# Patient Record
Sex: MALE | Race: WHITE | NOT HISPANIC OR LATINO | Employment: UNEMPLOYED | URBAN - METROPOLITAN AREA
[De-identification: names, ages, dates, MRNs, and addresses within clinical notes are randomized per-mention and may not be internally consistent; named-entity substitution may affect disease eponyms.]

---

## 2023-06-07 ENCOUNTER — OFFICE VISIT (OUTPATIENT)
Dept: FAMILY MEDICINE CLINIC | Facility: CLINIC | Age: 58
End: 2023-06-07
Payer: MEDICAID

## 2023-06-07 VITALS
TEMPERATURE: 98.3 F | WEIGHT: 198 LBS | OXYGEN SATURATION: 98 % | SYSTOLIC BLOOD PRESSURE: 132 MMHG | DIASTOLIC BLOOD PRESSURE: 88 MMHG | RESPIRATION RATE: 14 BRPM | HEART RATE: 72 BPM | BODY MASS INDEX: 27.72 KG/M2 | HEIGHT: 71 IN

## 2023-06-07 DIAGNOSIS — E78.2 MIXED HYPERLIPIDEMIA: ICD-10-CM

## 2023-06-07 DIAGNOSIS — M54.50 CHRONIC MIDLINE LOW BACK PAIN WITHOUT SCIATICA: ICD-10-CM

## 2023-06-07 DIAGNOSIS — E55.9 VITAMIN D DEFICIENCY: ICD-10-CM

## 2023-06-07 DIAGNOSIS — G89.29 CHRONIC MIDLINE LOW BACK PAIN WITHOUT SCIATICA: ICD-10-CM

## 2023-06-07 DIAGNOSIS — E87.8 ELECTROLYTE ABNORMALITY: ICD-10-CM

## 2023-06-07 DIAGNOSIS — K21.9 GASTROESOPHAGEAL REFLUX DISEASE WITHOUT ESOPHAGITIS: ICD-10-CM

## 2023-06-07 DIAGNOSIS — Z13.0 SCREENING, IRON DEFICIENCY ANEMIA: ICD-10-CM

## 2023-06-07 DIAGNOSIS — Z12.11 ENCOUNTER FOR COLORECTAL CANCER SCREENING: ICD-10-CM

## 2023-06-07 DIAGNOSIS — I10 ESSENTIAL HYPERTENSION: ICD-10-CM

## 2023-06-07 DIAGNOSIS — Z12.12 ENCOUNTER FOR COLORECTAL CANCER SCREENING: ICD-10-CM

## 2023-06-07 DIAGNOSIS — E11.9 TYPE 2 DIABETES MELLITUS WITHOUT COMPLICATION, WITHOUT LONG-TERM CURRENT USE OF INSULIN (HCC): Primary | ICD-10-CM

## 2023-06-07 DIAGNOSIS — Z12.5 PROSTATE CANCER SCREENING: ICD-10-CM

## 2023-06-07 DIAGNOSIS — Z13.29 THYROID DISORDER SCREENING: ICD-10-CM

## 2023-06-07 PROCEDURE — 99204 OFFICE O/P NEW MOD 45 MIN: CPT | Performed by: STUDENT IN AN ORGANIZED HEALTH CARE EDUCATION/TRAINING PROGRAM

## 2023-06-07 RX ORDER — ATORVASTATIN CALCIUM 20 MG/1
20 TABLET, FILM COATED ORAL
Qty: 90 TABLET | Refills: 1 | Status: SHIPPED | OUTPATIENT
Start: 2023-06-07

## 2023-06-07 RX ORDER — OMEPRAZOLE 20 MG/1
20 CAPSULE, DELAYED RELEASE ORAL DAILY
Qty: 90 CAPSULE | Refills: 1 | Status: SHIPPED | OUTPATIENT
Start: 2023-06-07

## 2023-06-07 RX ORDER — LOSARTAN POTASSIUM 100 MG/1
100 TABLET ORAL DAILY
Qty: 90 TABLET | Refills: 1 | Status: SHIPPED | OUTPATIENT
Start: 2023-06-07

## 2023-06-07 RX ORDER — ATORVASTATIN CALCIUM 20 MG/1
20 TABLET, FILM COATED ORAL
COMMUNITY
Start: 2023-05-22 | End: 2023-06-07 | Stop reason: SDUPTHER

## 2023-06-07 RX ORDER — CHLORAL HYDRATE 500 MG
1 CAPSULE ORAL 2 TIMES DAILY
COMMUNITY

## 2023-06-07 RX ORDER — OMEPRAZOLE 20 MG/1
20 CAPSULE, DELAYED RELEASE ORAL DAILY
COMMUNITY
End: 2023-06-07 | Stop reason: SDUPTHER

## 2023-06-07 RX ORDER — ASPIRIN 81 MG/1
81 TABLET ORAL DAILY
COMMUNITY

## 2023-06-07 RX ORDER — ZINC GLUCONATE 50 MG
50 TABLET ORAL DAILY
COMMUNITY

## 2023-06-07 RX ORDER — LOSARTAN POTASSIUM 100 MG/1
100 TABLET ORAL DAILY
COMMUNITY
Start: 2023-05-22 | End: 2023-06-07 | Stop reason: SDUPTHER

## 2023-06-07 NOTE — PROGRESS NOTES
Clinic Visit Note  Irma Espana 56 y o  male   MRN: [id number]    Assessment and Plan      Diagnoses and all orders for this visit:    Type 2 diabetes mellitus without complication, without long-term current use of insulin (HCC)  -     metFORMIN (GLUCOPHAGE) 500 mg tablet; Take 1 tablet (500 mg total) by mouth 2 (two) times a day  -     Hemoglobin A1C; Future    Gastroesophageal reflux disease without esophagitis  -     omeprazole (PriLOSEC) 20 mg delayed release capsule; Take 1 capsule (20 mg total) by mouth in the morning    Essential hypertension  -     losartan (COZAAR) 100 MG tablet; Take 1 tablet (100 mg total) by mouth in the morning  -     metoprolol tartrate (LOPRESSOR) 25 mg tablet; Take 1 tablet (25 mg total) by mouth 2 (two) times a day    Mixed hyperlipidemia  -     atorvastatin (LIPITOR) 20 mg tablet; Take 1 tablet (20 mg total) by mouth daily at bedtime  -     Lipid panel; Future    Chronic midline low back pain without sciatica  -     Diclofenac Sodium (VOLTAREN) 1 %; Apply 2 g topically 4 (four) times a day    Thyroid disorder screening  -     TSH, 3rd generation with Free T4 reflex; Future    Vitamin D deficiency  -     Vitamin D 25 hydroxy; Future    Screening, iron deficiency anemia  -     CBC and differential; Future    Prostate cancer screening  -     PSA, total and free; Future    Encounter for colorectal cancer screening  -     Cologuard    Electrolyte abnormality  -     Comprehensive metabolic panel; Future    Other orders  -     Omega-3 Fatty Acids (fish oil) 1,000 mg; Take 1 capsule by mouth 2 (two) times a day  -     aspirin (ECOTRIN LOW STRENGTH) 81 mg EC tablet; Take 81 mg by mouth daily  -     Discontinue: atorvastatin (LIPITOR) 20 mg tablet; Take 20 mg by mouth daily at bedtime  -     Cholecalciferol 25 MCG (1000 UT) capsule; Take 5,000 Units by mouth  -     Discontinue: losartan (COZAAR) 100 MG tablet;  Take 100 mg by mouth in the morning  -     Discontinue: metFORMIN (GLUCOPHAGE) 500 mg tablet; Take 500 mg by mouth 2 (two) times a day  -     Discontinue: metoprolol tartrate (LOPRESSOR) 25 mg tablet; Take 25 mg by mouth 2 (two) times a day  -     Discontinue: omeprazole (PriLOSEC) 20 mg delayed release capsule; Take 20 mg by mouth in the morning  -     Zinc 50 MG TABS; Take 50 mg by mouth in the morning      My impressions and treatment recommendations were discussed in detail with the patient who verbalized understanding and had no further questions  Discharge instructions were provided  Subjective     Chief Complaint: NP    History of Present Illness:    Patient is a pleasant 53-year-old male coming in for new patient/establish care visit, medications reviewed, medications refilled  Recommending annual blood work with annual physical follow-up  The following portions of the patient's history were reviewed and updated as appropriate: allergies, current medications, past family history, past medical history, past social history, past surgical history and problem list     REVIEW OF SYSTEMS:  A complete 12-point review of systems is negative other than that noted in the HPI  Review of Systems   Constitutional: Negative for chills, fatigue and fever  HENT: Negative for congestion and sore throat  Eyes: Negative for pain and visual disturbance  Respiratory: Negative for shortness of breath and wheezing  Cardiovascular: Negative for chest pain and palpitations  Gastrointestinal: Negative for abdominal pain, constipation, diarrhea, nausea and vomiting  Genitourinary: Negative for dysuria and frequency  Musculoskeletal: Positive for back pain  Negative for neck pain  Skin: Negative for color change and rash  Neurological: Negative for dizziness and headaches  Psychiatric/Behavioral: Negative for agitation and confusion  All other systems reviewed and are negative          Current Outpatient Medications:   •  aspirin (ECOTRIN LOW STRENGTH) 81 mg EC tablet, Take 81 mg by mouth daily, Disp: , Rfl:   •  atorvastatin (LIPITOR) 20 mg tablet, Take 1 tablet (20 mg total) by mouth daily at bedtime, Disp: 90 tablet, Rfl: 1  •  Cholecalciferol 25 MCG (1000 UT) capsule, Take 5,000 Units by mouth, Disp: , Rfl:   •  Diclofenac Sodium (VOLTAREN) 1 %, Apply 2 g topically 4 (four) times a day, Disp: 150 g, Rfl: 0  •  losartan (COZAAR) 100 MG tablet, Take 1 tablet (100 mg total) by mouth in the morning, Disp: 90 tablet, Rfl: 1  •  metFORMIN (GLUCOPHAGE) 500 mg tablet, Take 1 tablet (500 mg total) by mouth 2 (two) times a day, Disp: 180 tablet, Rfl: 1  •  metoprolol tartrate (LOPRESSOR) 25 mg tablet, Take 1 tablet (25 mg total) by mouth 2 (two) times a day, Disp: 180 tablet, Rfl: 1  •  Omega-3 Fatty Acids (fish oil) 1,000 mg, Take 1 capsule by mouth 2 (two) times a day, Disp: , Rfl:   •  omeprazole (PriLOSEC) 20 mg delayed release capsule, Take 1 capsule (20 mg total) by mouth in the morning, Disp: 90 capsule, Rfl: 1  •  Zinc 50 MG TABS, Take 50 mg by mouth in the morning, Disp: , Rfl:   Past Medical History:   Diagnosis Date   • Diabetes mellitus ([facility] [address] )    • High cholesterol    • Hypertension      Past Surgical History:   Procedure Laterality Date   • CARDIAC CATHETERIZATION       Social History     Socioeconomic History   • Marital status: Single     Spouse name: Not on file   • Number of children: Not on file   • Years of education: Not on file   • Highest education level: Not on file   Occupational History   • Not on file   Tobacco Use   • Smoking status: Never   • Smokeless tobacco: Never   Vaping Use   • Vaping Use: Never used   Substance and Sexual Activity   • Alcohol use: Yes     Comment: rare   • Drug use: Never   • Sexual activity: Not on file   Other Topics Concern   • Not on file   Social History Narrative   • Not on file     Social Determinants of Health     Financial Resource Strain: Not on file   Food Insecurity: Not on file   Transportation Needs: Not on file "  Physical Activity: Not on file   Stress: Not on file   Social Connections: Not on file   Intimate Partner Violence: Not on file   Housing Stability: Not on file     Family History   Problem Relation Age of Onset   • Diabetes Mother    • Hypertension Father      Allergies   Allergen Reactions   • Gadolinium Derivatives Angioedema   • Penicillins Rash       Objective     Vitals:    06/07/23 0818   BP: 132/88   BP Location: Left arm   Patient Position: Sitting   Cuff Size: Adult   Pulse: 72   Resp: 14   Temp: 98 3 °F (36 8 °C)   TempSrc: Temporal   SpO2: 98%   Weight: 89 8 kg (198 lb)   Height: 5' 11\" (1 803 m)       Physical Exam:     GENERAL: NAD, pleasant   HEENT:  NC/AT, PERRL, EOMI, no scleral icterus  CARDIAC:  RRR, +S1/S2, no S3/S4 appreciated, no m/g/r  PULMONARY:  CTA B/L, no wheezing/rales/rhonci, non-labored breathing  ABDOMEN:  Soft, NT/ND, no rebound/guarding/rigidity  Extremities:  No edema, cyanosis, or clubbing  Musculoskeletal:  Full range of motion intact in all extremities   NEUROLOGIC: Grossly intact, no focal deficits  SKIN:  No rashes or erythema noted on exposed skin  Psych: Normal affect, mood stable    ==  PLEASE NOTE:  This encounter was completed utilizing the [employer] Direct Speech Voice Recognition Software  Grammatical errors, random word insertions, pronoun errors and incomplete sentences are occasional consequences of the system due to software limitations, ambient noise and hardware issues  These may be missed by proof reading prior to affixing electronic signature  Any questions or concerns about the content, text or information contained within the body of this dictation should be directly addressed to the physician for clarification  Please do not hesitate to call me directly if you have any any questions or concerns      DO Venkata Juarez [address] Internal Medicine   [facility]    "

## 2023-06-24 LAB
25(OH)D3+25(OH)D2 SERPL-MCNC: 87.3 NG/ML (ref 30–100)
ALBUMIN SERPL-MCNC: 4.4 G/DL (ref 3.8–4.9)
ALBUMIN/GLOB SERPL: 1.6 {RATIO} (ref 1.2–2.2)
ALP SERPL-CCNC: 77 IU/L (ref 44–121)
ALT SERPL-CCNC: 20 IU/L (ref 0–44)
AST SERPL-CCNC: 21 IU/L (ref 0–40)
BASOPHILS # BLD AUTO: 0.1 X10E3/UL (ref 0–0.2)
BASOPHILS NFR BLD AUTO: 1 %
BILIRUB SERPL-MCNC: 1 MG/DL (ref 0–1.2)
BUN SERPL-MCNC: 18 MG/DL (ref 6–24)
BUN/CREAT SERPL: 15 (ref 9–20)
CALCIUM SERPL-MCNC: 10.1 MG/DL (ref 8.7–10.2)
CHLORIDE SERPL-SCNC: 93 MMOL/L (ref 96–106)
CHOLEST SERPL-MCNC: 159 MG/DL (ref 100–199)
CO2 SERPL-SCNC: 22 MMOL/L (ref 20–29)
CREAT SERPL-MCNC: 1.24 MG/DL (ref 0.76–1.27)
EGFR: 69 ML/MIN/1.73
EOSINOPHIL # BLD AUTO: 0.2 X10E3/UL (ref 0–0.4)
EOSINOPHIL NFR BLD AUTO: 2 %
ERYTHROCYTE [DISTWIDTH] IN BLOOD BY AUTOMATED COUNT: 12.2 % (ref 11.6–15.4)
GLOBULIN SER-MCNC: 2.7 G/DL (ref 1.5–4.5)
GLUCOSE SERPL-MCNC: 236 MG/DL (ref 70–99)
HBA1C MFR BLD: 11.1 % (ref 4.8–5.6)
HCT VFR BLD AUTO: 48.5 % (ref 37.5–51)
HDLC SERPL-MCNC: 37 MG/DL
HGB BLD-MCNC: 16.9 G/DL (ref 13–17.7)
IMM GRANULOCYTES # BLD: 0 X10E3/UL (ref 0–0.1)
IMM GRANULOCYTES NFR BLD: 0 %
LDLC SERPL CALC-MCNC: 88 MG/DL (ref 0–99)
LYMPHOCYTES # BLD AUTO: 2.6 X10E3/UL (ref 0.7–3.1)
LYMPHOCYTES NFR BLD AUTO: 29 %
MCH RBC QN AUTO: 29.8 PG (ref 26.6–33)
MCHC RBC AUTO-ENTMCNC: 34.8 G/DL (ref 31.5–35.7)
MCV RBC AUTO: 86 FL (ref 79–97)
MICRODELETION SYND BLD/T FISH: NORMAL
MONOCYTES # BLD AUTO: 0.6 X10E3/UL (ref 0.1–0.9)
MONOCYTES NFR BLD AUTO: 7 %
NEUTROPHILS # BLD AUTO: 5.4 X10E3/UL (ref 1.4–7)
NEUTROPHILS NFR BLD AUTO: 61 %
PLATELET # BLD AUTO: 272 X10E3/UL (ref 150–450)
POTASSIUM SERPL-SCNC: 4.8 MMOL/L (ref 3.5–5.2)
PROT SERPL-MCNC: 7.1 G/DL (ref 6–8.5)
PSA FREE MFR SERPL: 57.7 %
PSA FREE SERPL-MCNC: 0.75 NG/ML
PSA SERPL-MCNC: 1.3 NG/ML (ref 0–4)
RBC # BLD AUTO: 5.67 X10E6/UL (ref 4.14–5.8)
SL AMB VLDL CHOLESTEROL CALC: 34 MG/DL (ref 5–40)
SODIUM SERPL-SCNC: 132 MMOL/L (ref 134–144)
TRIGL SERPL-MCNC: 200 MG/DL (ref 0–149)
TSH SERPL DL<=0.005 MIU/L-ACNC: 2.16 UIU/ML (ref 0.45–4.5)
WBC # BLD AUTO: 9 X10E3/UL (ref 3.4–10.8)

## 2023-07-04 LAB — COLOGUARD RESULT REPORTABLE: NEGATIVE

## 2023-07-12 ENCOUNTER — OFFICE VISIT (OUTPATIENT)
Dept: FAMILY MEDICINE CLINIC | Facility: CLINIC | Age: 58
End: 2023-07-12
Payer: MEDICAID

## 2023-07-12 VITALS
BODY MASS INDEX: 28.92 KG/M2 | TEMPERATURE: 98.3 F | DIASTOLIC BLOOD PRESSURE: 80 MMHG | RESPIRATION RATE: 14 BRPM | WEIGHT: 202 LBS | SYSTOLIC BLOOD PRESSURE: 140 MMHG | HEIGHT: 70 IN | OXYGEN SATURATION: 98 % | HEART RATE: 66 BPM

## 2023-07-12 DIAGNOSIS — E78.2 MIXED HYPERLIPIDEMIA: ICD-10-CM

## 2023-07-12 DIAGNOSIS — G89.29 CHRONIC MIDLINE LOW BACK PAIN WITHOUT SCIATICA: ICD-10-CM

## 2023-07-12 DIAGNOSIS — I10 ESSENTIAL HYPERTENSION: ICD-10-CM

## 2023-07-12 DIAGNOSIS — Z23 NEED FOR VACCINATION: ICD-10-CM

## 2023-07-12 DIAGNOSIS — M54.50 CHRONIC MIDLINE LOW BACK PAIN WITHOUT SCIATICA: ICD-10-CM

## 2023-07-12 DIAGNOSIS — Z00.00 ANNUAL PHYSICAL EXAM: Primary | ICD-10-CM

## 2023-07-12 DIAGNOSIS — K21.9 GASTROESOPHAGEAL REFLUX DISEASE WITHOUT ESOPHAGITIS: ICD-10-CM

## 2023-07-12 DIAGNOSIS — E11.9 TYPE 2 DIABETES MELLITUS WITHOUT COMPLICATION, WITHOUT LONG-TERM CURRENT USE OF INSULIN (HCC): ICD-10-CM

## 2023-07-12 PROCEDURE — 90471 IMMUNIZATION ADMIN: CPT | Performed by: STUDENT IN AN ORGANIZED HEALTH CARE EDUCATION/TRAINING PROGRAM

## 2023-07-12 PROCEDURE — 99396 PREV VISIT EST AGE 40-64: CPT | Performed by: STUDENT IN AN ORGANIZED HEALTH CARE EDUCATION/TRAINING PROGRAM

## 2023-07-12 PROCEDURE — 90715 TDAP VACCINE 7 YRS/> IM: CPT | Performed by: STUDENT IN AN ORGANIZED HEALTH CARE EDUCATION/TRAINING PROGRAM

## 2023-07-12 RX ORDER — GLIPIZIDE 5 MG/1
5 TABLET ORAL
Qty: 180 TABLET | Refills: 3 | Status: SHIPPED | OUTPATIENT
Start: 2023-07-12

## 2023-07-12 NOTE — PROGRESS NOTES
[facility]    NAME: Jonna Friend  AGE: 66 y.o. SEX: male  : 1965     DATE: 2023     Assessment and Plan:     Problem List Items Addressed This Visit        Digestive    Gastroesophageal reflux disease without esophagitis       Endocrine    Type 2 diabetes mellitus without complication, without long-term current use of insulin (HCC)    Relevant Medications    metFORMIN (GLUCOPHAGE) 1000 MG tablet    glipiZIDE (GLUCOTROL) 5 mg tablet    Other Relevant Orders    Albumin / creatinine urine ratio    Hemoglobin A1C       Cardiovascular and Mediastinum    Essential hypertension       Other    Mixed hyperlipidemia    Chronic midline low back pain without sciatica   Other Visit Diagnoses     Annual physical exam    -  Primary    Relevant Orders    TDAP VACCINE GREATER THAN OR EQUAL TO 8YO IM (Completed)    Need for vaccination        Relevant Orders    TDAP VACCINE GREATER THAN OR EQUAL TO 8YO IM (Completed)        Physical exam completed in office today, type 2 diabetes mellitus uncontrolled with a hemoglobin A1c of 11.1%, we discussed lifestyle modifications including low added sugar diet, routine exercise, patient very adamant that he would not like to start insulin or injectable therapy, we will increase metformin and add glipizide. Plans to recheck hemoglobin A1c in 3 months. Continue antihypertensive regimen with losartan and metoprolol tartrate. Aspirin/statin therapy. Follow-up 3 months recommended. Immunizations and preventive care screenings were discussed with patient today. Appropriate education was printed on patient's after visit summary. Discussed risks and benefits of prostate cancer screening. We discussed the controversial history of PSA screening for prostate cancer in the [facility] as well as the risk of over detection and over treatment of prostate cancer by way of PSA screening.   The patient understands that PSA blood testing is an imperfect way to screen for prostate cancer and that elevated PSA levels in the blood may also be caused by infection, inflammation, prostatic trauma or manipulation, urological procedures, or by benign prostatic enlargement. The role of the digital rectal examination in prostate cancer screening was also discussed and I discussed with him that there is large interobserver variability in the findings of digital rectal examination. Counseling:  Alcohol/drug use: discussed moderation in alcohol intake, the recommendations for healthy alcohol use, and avoidance of illicit drug use. Dental Health: discussed importance of regular tooth brushing, flossing, and dental visits. Injury prevention: discussed safety/seat belts, safety helmets, smoke detectors, carbon dioxide detectors, and smoking near bedding or upholstery. Sexual health: discussed sexually transmitted diseases, partner selection, use of condoms, avoidance of unintended pregnancy, and contraceptive alternatives. Exercise: the importance of regular exercise/physical activity was discussed. Recommend exercise 3-5 times per week for at least 30 minutes. BMI Counseling: Body mass index is 29.4 kg/m². The BMI is above normal. Nutrition recommendations include decreasing portion sizes, encouraging healthy choices of fruits and vegetables and moderation in carbohydrate intake. Exercise recommendations include moderate physical activity 150 minutes/week. Patient referred to PCP. Rationale for BMI follow-up plan is due to patient being overweight or obese. Depression Screening and Follow-up Plan: Patient was screened for depression during today's encounter. They screened negative with a PHQ-2 score of 0. Return in about 3 months (around 10/12/2023), or if symptoms worsen or fail to improve.      Chief Complaint:     Chief Complaint   Patient presents with   • Physical Exam      History of Present Illness:     Adult Annual Physical   Patient here for a comprehensive physical exam. The patient reports no problems. Diet and Physical Activity  Diet/Nutrition: diabetic diet. Exercise: no formal exercise. Depression Screening  PHQ-2/9 Depression Screening    Little interest or pleasure in doing things: 0 - not at all  Feeling down, depressed, or hopeless: 0 - not at all  PHQ-2 Score: 0  PHQ-2 Interpretation: Negative depression screen       General Health  Sleep: sleeps well. Hearing: normal - bilateral.  Vision: no vision problems. Dental: regular dental visits.  Health  Symptoms include: none     Review of Systems:     Review of Systems   Constitutional: Negative for chills, fatigue and fever. HENT: Negative for congestion and sore throat. Eyes: Negative for pain and visual disturbance. Respiratory: Negative for shortness of breath and wheezing. Cardiovascular: Negative for chest pain and palpitations. Gastrointestinal: Negative for abdominal pain, constipation, diarrhea, nausea and vomiting. Genitourinary: Negative for dysuria and frequency. Musculoskeletal: Negative for back pain and neck pain. Skin: Negative for color change and rash. Neurological: Negative for dizziness and headaches. Psychiatric/Behavioral: Negative for agitation and confusion. All other systems reviewed and are negative.      Past Medical History:     Past Medical History:   Diagnosis Date   • Diabetes mellitus ([address])    • High cholesterol    • Hypertension       Past Surgical History:     Past Surgical History:   Procedure Laterality Date   • CARDIAC CATHETERIZATION        Family History:     Family History   Problem Relation Age of Onset   • Diabetes Mother    • Hypertension Father       Social History:     Social History     Socioeconomic History   • Marital status: Single     Spouse name: None   • Number of children: None   • Years of education: None   • Highest education level: None Occupational History   • None   Tobacco Use   • Smoking status: Never   • Smokeless tobacco: Never   Vaping Use   • Vaping Use: Never used   Substance and Sexual Activity   • Alcohol use: Yes     Comment: rare   • Drug use: Never   • Sexual activity: None   Other Topics Concern   • None   Social History Narrative   • None     Social Determinants of Health     Financial Resource Strain: Not on file   Food Insecurity: Not on file   Transportation Needs: Not on file   Physical Activity: Not on file   Stress: Not on file   Social Connections: Not on file   Intimate Partner Violence: Not on file   Housing Stability: Not on file      Current Medications:     Current Outpatient Medications   Medication Sig Dispense Refill   • aspirin (ECOTRIN LOW STRENGTH) 81 mg EC tablet Take 81 mg by mouth daily     • atorvastatin (LIPITOR) 20 mg tablet Take 1 tablet (20 mg total) by mouth daily at bedtime 90 tablet 1   • Cholecalciferol 25 MCG (1000 UT) capsule Take 5,000 Units by mouth     • glipiZIDE (GLUCOTROL) 5 mg tablet Take 1 tablet (5 mg total) by mouth 2 (two) times a day before meals 180 tablet 3   • losartan (COZAAR) 100 MG tablet Take 1 tablet (100 mg total) by mouth in the morning 90 tablet 1   • metFORMIN (GLUCOPHAGE) 1000 MG tablet Take 1 tablet (1,000 mg total) by mouth 2 (two) times a day with meals 180 tablet 3   • metoprolol tartrate (LOPRESSOR) 25 mg tablet Take 1 tablet (25 mg total) by mouth 2 (two) times a day 180 tablet 1   • Omega-3 Fatty Acids (fish oil) 1,000 mg Take 1 capsule by mouth 2 (two) times a day     • omeprazole (PriLOSEC) 20 mg delayed release capsule Take 1 capsule (20 mg total) by mouth in the morning 90 capsule 1   • Zinc 50 MG TABS Take 50 mg by mouth in the morning       No current facility-administered medications for this visit. Allergies:      Allergies   Allergen Reactions   • Gadolinium Derivatives Angioedema   • Penicillins Rash      Physical Exam:     /80 (BP Location: Left arm, Patient Position: Sitting, Cuff Size: Standard)   Pulse 66   Temp 98.3 °F (36.8 °C) (Temporal)   Resp 14   Ht 5' 9.5" (1.765 m)   Wt 91.6 kg (202 lb)   SpO2 98%   BMI 29.40 kg/m²     Physical Exam  Constitutional:       General: He is not in acute distress. HENT:      Head: Normocephalic and atraumatic. Eyes:      General: No scleral icterus. Conjunctiva/sclera: Conjunctivae normal.   Cardiovascular:      Rate and Rhythm: Normal rate and regular rhythm. Pulses: Normal pulses. Heart sounds: No murmur heard. Pulmonary:      Effort: Pulmonary effort is normal. No respiratory distress. Breath sounds: Normal breath sounds. No wheezing or rales. Abdominal:      General: Bowel sounds are normal. There is no distension. Palpations: Abdomen is soft. Tenderness: There is no abdominal tenderness. There is no guarding. Musculoskeletal:         General: No swelling. Normal range of motion. Skin:     General: Skin is warm and dry. Capillary Refill: Capillary refill takes less than 2 seconds. Neurological:      General: No focal deficit present. Mental Status: He is alert and oriented to person, place, and time. Mental status is at baseline.    Psychiatric:         Mood and Affect: Mood normal.         Behavior: Behavior normal.          Gelacio Aguillon DO  [address] [place] [address]

## 2023-10-20 ENCOUNTER — OFFICE VISIT (OUTPATIENT)
Dept: FAMILY MEDICINE CLINIC | Facility: CLINIC | Age: 58
End: 2023-10-20

## 2023-10-20 ENCOUNTER — TELEPHONE (OUTPATIENT)
Age: 58
End: 2023-10-20

## 2023-10-20 VITALS
DIASTOLIC BLOOD PRESSURE: 72 MMHG | OXYGEN SATURATION: 98 % | BODY MASS INDEX: 28.77 KG/M2 | HEIGHT: 70 IN | HEART RATE: 72 BPM | WEIGHT: 201 LBS | TEMPERATURE: 98.2 F | RESPIRATION RATE: 14 BRPM | SYSTOLIC BLOOD PRESSURE: 120 MMHG

## 2023-10-20 DIAGNOSIS — M54.50 CHRONIC MIDLINE LOW BACK PAIN WITHOUT SCIATICA: ICD-10-CM

## 2023-10-20 DIAGNOSIS — E11.9 TYPE 2 DIABETES MELLITUS WITHOUT COMPLICATION, WITHOUT LONG-TERM CURRENT USE OF INSULIN (HCC): Primary | ICD-10-CM

## 2023-10-20 DIAGNOSIS — I10 ESSENTIAL HYPERTENSION: ICD-10-CM

## 2023-10-20 DIAGNOSIS — E78.2 MIXED HYPERLIPIDEMIA: ICD-10-CM

## 2023-10-20 DIAGNOSIS — G89.29 CHRONIC MIDLINE LOW BACK PAIN WITHOUT SCIATICA: ICD-10-CM

## 2023-10-20 DIAGNOSIS — Z23 ENCOUNTER FOR IMMUNIZATION: ICD-10-CM

## 2023-10-20 DIAGNOSIS — K21.9 GASTROESOPHAGEAL REFLUX DISEASE WITHOUT ESOPHAGITIS: ICD-10-CM

## 2023-10-20 LAB — SL AMB POCT HEMOGLOBIN AIC: 10.2 (ref ?–6.5)

## 2023-10-20 RX ORDER — INSULIN GLARGINE 100 [IU]/ML
20 INJECTION, SOLUTION SUBCUTANEOUS
Qty: 15 ML | Refills: 1 | Status: SHIPPED | OUTPATIENT
Start: 2023-10-20

## 2023-10-20 RX ORDER — NEEDLES, DISPOSABLE 25GX5/8"
NEEDLE, DISPOSABLE MISCELLANEOUS
Qty: 100 EACH | Refills: 0 | Status: SHIPPED | OUTPATIENT
Start: 2023-10-20

## 2023-10-20 RX ORDER — LANCETS
EACH MISCELLANEOUS
Qty: 100 EACH | Refills: 1 | Status: SHIPPED | OUTPATIENT
Start: 2023-10-20

## 2023-10-20 NOTE — PROGRESS NOTES
Clinic Visit Note  Latoya Conn 66 y.o. male   MRN: [id number]    Assessment and Plan      Diagnoses and all orders for this visit:    Type 2 diabetes mellitus without complication, without long-term current use of insulin ([address])  Given that hemoglobin A1c greater than 10%, recommend endocrinology evaluation, insulin therapy with Basaglar 20 units nightly which should be covered by insurance. If patient does start insulin therapy recommend discontinuing glipizide, discussed in office, continue metformin, statin, ARB therapy. -     POCT hemoglobin A1c  -     Ambulatory Referral to Endocrinology; Future  -     Discontinue: insulin glargine (LANTUS SOLOSTAR) 100 units/mL injection pen; Inject 20 Units under the skin daily  -     NEEDLE, DISP, 30 G (BD Disp Needles) 30G X 1/2" MISC; Use daily at bedtime    Encounter for immunization  -     FLUZONE: influenza vaccine, quadrivalent, 0.5 mL    Essential hypertension  Blood pressure appropriate in office today continue antihypertension regimen    Mixed hyperlipidemia  Continue statin therapy    Gastroesophageal reflux disease without esophagitis  Continue PPI therapy    Chronic midline low back pain without sciatica  Improved, continue to monitor symptoms, stretching/strengthening exercises    My impressions and treatment recommendations were discussed in detail with the patient who verbalized understanding and had no further questions. Discharge instructions were provided. Subjective     Chief Complaint: F/U    History of Present Illness:    Patient is a pleasant 68-year-old male coming in for follow-up on diabetic management, hemoglobin A1c 10.2%, long discussion on starting insulin therapy.     The following portions of the patient's history were reviewed and updated as appropriate: allergies, current medications, past family history, past medical history, past social history, past surgical history and problem list.    REVIEW OF SYSTEMS:  A complete 12-point review of systems is negative other than that noted in the HPI. Review of Systems   Constitutional:  Negative for chills, fatigue and fever. HENT:  Negative for congestion and sore throat. Eyes:  Negative for pain and visual disturbance. Respiratory:  Negative for shortness of breath and wheezing. Cardiovascular:  Negative for chest pain and palpitations. Gastrointestinal:  Negative for abdominal pain, constipation, diarrhea, nausea and vomiting. Genitourinary:  Negative for dysuria and frequency. Musculoskeletal:  Negative for back pain and neck pain. Skin:  Negative for color change and rash. Neurological:  Negative for dizziness and headaches. Psychiatric/Behavioral:  Negative for agitation and confusion. All other systems reviewed and are negative.         Current Outpatient Medications:   •  aspirin (ECOTRIN LOW STRENGTH) 81 mg EC tablet, Take 81 mg by mouth daily, Disp: , Rfl:   •  atorvastatin (LIPITOR) 20 mg tablet, Take 1 tablet (20 mg total) by mouth daily at bedtime, Disp: 90 tablet, Rfl: 1  •  Cholecalciferol 25 MCG (1000 UT) capsule, Take 5,000 Units by mouth, Disp: , Rfl:   •  glipiZIDE (GLUCOTROL) 5 mg tablet, Take 1 tablet (5 mg total) by mouth 2 (two) times a day before meals, Disp: 180 tablet, Rfl: 3  •  losartan (COZAAR) 100 MG tablet, Take 1 tablet (100 mg total) by mouth in the morning, Disp: 90 tablet, Rfl: 1  •  metFORMIN (GLUCOPHAGE) 1000 MG tablet, Take 1 tablet (1,000 mg total) by mouth 2 (two) times a day with meals, Disp: 180 tablet, Rfl: 3  •  metoprolol tartrate (LOPRESSOR) 25 mg tablet, Take 1 tablet (25 mg total) by mouth 2 (two) times a day, Disp: 180 tablet, Rfl: 1  •  NEEDLE, DISP, 30 G (BD Disp Needles) 30G X 1/2" MISC, Use daily at bedtime, Disp: 100 each, Rfl: 0  •  Omega-3 Fatty Acids (fish oil) 1,000 mg, Take 1 capsule by mouth 2 (two) times a day, Disp: , Rfl:   •  omeprazole (PriLOSEC) 20 mg delayed release capsule, Take 1 capsule (20 mg total) by mouth in the morning, Disp: 90 capsule, Rfl: 1  •  Zinc 50 MG TABS, Take 50 mg by mouth in the morning, Disp: , Rfl:   •  Insulin Glargine Solostar (Basaglar KwikPen) 100 UNIT/ML SOPN, Inject 0.2 mL (20 Units total) under the skin daily at bedtime, Disp: 15 mL, Rfl: 1  •  Lancets (onetouch ultrasoft) lancets, Use as instructed, Disp: 100 each, Rfl: 1  Past Medical History:   Diagnosis Date   • Diabetes mellitus ([address])    • High cholesterol    • Hypertension      Past Surgical History:   Procedure Laterality Date   • CARDIAC CATHETERIZATION       Social History     Socioeconomic History   • Marital status: Single     Spouse name: Not on file   • Number of children: Not on file   • Years of education: Not on file   • Highest education level: Not on file   Occupational History   • Not on file   Tobacco Use   • Smoking status: Never   • Smokeless tobacco: Never   Vaping Use   • Vaping Use: Never used   Substance and Sexual Activity   • Alcohol use: Yes     Comment: rare   • Drug use: Never   • Sexual activity: Not on file   Other Topics Concern   • Not on file   Social History Narrative   • Not on file     Social Determinants of Health     Financial Resource Strain: Not on file   Food Insecurity: Not on file   Transportation Needs: Not on file   Physical Activity: Not on file   Stress: Not on file   Social Connections: Not on file   Intimate Partner Violence: Not on file   Housing Stability: Not on file     Family History   Problem Relation Age of Onset   • Diabetes Mother    • Hypertension Father      Allergies   Allergen Reactions   • Gadolinium Derivatives Angioedema   • Penicillins Rash       Objective     Vitals:    10/20/23 1156   BP: 120/72   BP Location: Left arm   Patient Position: Sitting   Cuff Size: Adult   Pulse: 72   Resp: 14   Temp: 98.2 °F (36.8 °C)   TempSrc: Temporal   SpO2: 98%   Weight: 91.2 kg (201 lb)   Height: 5' 9.5" (1.765 m)       Physical Exam:     GENERAL: NAD, pleasant   HEENT:  NC/AT, PERRL, EOMI, no scleral icterus  CARDIAC:  RRR, +S1/S2, no S3/S4 appreciated, no m/g/r  PULMONARY:  CTA B/L, no wheezing/rales/rhonci, non-labored breathing  ABDOMEN:  Soft, NT/ND, no rebound/guarding/rigidity  Extremities:. No edema, cyanosis, or clubbing  Musculoskeletal:  Full range of motion intact in all extremities   NEUROLOGIC: Grossly intact, no focal deficits  SKIN:  No rashes or erythema noted on exposed skin  Psych: Normal affect, mood stable    ==  PLEASE NOTE:  This encounter was completed utilizing the [employer] Direct Speech Voice Recognition Software. Grammatical errors, random word insertions, pronoun errors and incomplete sentences are occasional consequences of the system due to software limitations, ambient noise and hardware issues. These may be missed by proof reading prior to affixing electronic signature. Any questions or concerns about the content, text or information contained within the body of this dictation should be directly addressed to the physician for clarification. Please do not hesitate to call me directly if you have any any questions or concerns.     DO Sanjeev Baez [facility]   [facility]

## 2023-10-20 NOTE — TELEPHONE ENCOUNTER
Received a call from Matt Sanchez from [employer]. Matt Sanchez states the doctor ordered Lantus & ot is not covered. The preferred medication is Basaglar, also need an order for lancets.

## 2023-11-16 DIAGNOSIS — I10 ESSENTIAL HYPERTENSION: ICD-10-CM

## 2023-11-16 RX ORDER — LOSARTAN POTASSIUM 100 MG/1
100 TABLET ORAL EVERY MORNING
Qty: 90 TABLET | Refills: 1 | Status: SHIPPED | OUTPATIENT
Start: 2023-11-16

## 2023-11-17 DIAGNOSIS — E78.2 MIXED HYPERLIPIDEMIA: ICD-10-CM

## 2023-11-17 DIAGNOSIS — I10 ESSENTIAL HYPERTENSION: ICD-10-CM

## 2023-11-17 DIAGNOSIS — K21.9 GASTROESOPHAGEAL REFLUX DISEASE WITHOUT ESOPHAGITIS: ICD-10-CM

## 2023-11-17 RX ORDER — ATORVASTATIN CALCIUM 20 MG/1
20 TABLET, FILM COATED ORAL
Qty: 90 TABLET | Refills: 1 | Status: SHIPPED | OUTPATIENT
Start: 2023-11-17

## 2023-11-17 RX ORDER — OMEPRAZOLE 20 MG/1
20 CAPSULE, DELAYED RELEASE ORAL DAILY
Qty: 90 CAPSULE | Refills: 1 | Status: SHIPPED | OUTPATIENT
Start: 2023-11-17

## 2023-11-17 NOTE — TELEPHONE ENCOUNTER
Reason for call:   [x] Refill   [] Prior Auth  [] Other:     Office:   [x] PCP/Provider - Chary  [] Specialty/Provider -     Medication:     Atorvastatin 20mg QD #90  Metoprolol Tartrate 25mg BID #180  Omeprazole 20mg QD #90     Pharmacy: [facility]    Request from pharmacy received

## 2023-11-28 RX ORDER — PEN NEEDLE, DIABETIC 31 GX5/16"
NEEDLE, DISPOSABLE MISCELLANEOUS
COMMUNITY
Start: 2023-10-20

## 2023-11-29 ENCOUNTER — CONSULT (OUTPATIENT)
Dept: ENDOCRINOLOGY | Facility: CLINIC | Age: 58
End: 2023-11-29
Payer: MEDICAID

## 2023-11-29 VITALS
HEIGHT: 70 IN | WEIGHT: 200 LBS | SYSTOLIC BLOOD PRESSURE: 160 MMHG | OXYGEN SATURATION: 98 % | BODY MASS INDEX: 28.63 KG/M2 | HEART RATE: 80 BPM | DIASTOLIC BLOOD PRESSURE: 100 MMHG

## 2023-11-29 DIAGNOSIS — E11.65 TYPE 2 DIABETES MELLITUS WITH HYPERGLYCEMIA, WITH LONG-TERM CURRENT USE OF INSULIN (HCC): Primary | ICD-10-CM

## 2023-11-29 DIAGNOSIS — E11.9 TYPE 2 DIABETES MELLITUS WITHOUT COMPLICATION, WITHOUT LONG-TERM CURRENT USE OF INSULIN (HCC): ICD-10-CM

## 2023-11-29 DIAGNOSIS — I10 ESSENTIAL HYPERTENSION: ICD-10-CM

## 2023-11-29 DIAGNOSIS — Z79.4 TYPE 2 DIABETES MELLITUS WITH HYPERGLYCEMIA, WITH LONG-TERM CURRENT USE OF INSULIN (HCC): Primary | ICD-10-CM

## 2023-11-29 DIAGNOSIS — E66.3 OVERWEIGHT (BMI 25.0-29.9): ICD-10-CM

## 2023-11-29 DIAGNOSIS — E78.2 MIXED HYPERLIPIDEMIA: ICD-10-CM

## 2023-11-29 PROCEDURE — 99245 OFF/OP CONSLTJ NEW/EST HI 55: CPT | Performed by: INTERNAL MEDICINE

## 2023-11-29 RX ORDER — BLOOD-GLUCOSE METER
EACH MISCELLANEOUS 2 TIMES DAILY
Qty: 1 KIT | Refills: 0 | Status: SHIPPED | OUTPATIENT
Start: 2023-11-29

## 2023-11-29 RX ORDER — LANCETS
EACH MISCELLANEOUS
Qty: 100 EACH | Refills: 3 | Status: SHIPPED | OUTPATIENT
Start: 2023-11-29

## 2023-11-29 RX ORDER — BLOOD SUGAR DIAGNOSTIC
STRIP MISCELLANEOUS
Qty: 100 STRIP | Refills: 2 | Status: SHIPPED | OUTPATIENT
Start: 2023-11-29

## 2023-11-29 NOTE — PROGRESS NOTES
Kiko Riding 66 y.o. male MRN: [id number]    Encounter: [id number]  Referring Provider  Shaheed Steinberg,   [facility],  [address] [place] [address]    Assessment/Plan     Type 2 diabetes mellitus on long-term insulin with hyperglycemia  Overweight, BMI 28  Poorly controlled with A1c 10.3% in 10/2023    Discussed different medications that can be used for glycemic management-oral hypoglycemic agents as well as injectable insulin and non-insulin medications (GLP 1 agonist), Tirzepatide (GLP1 Agonist/GIP) along with risks, benefits, side effect profile. No h/o pancreatitis/ MEN syndrome/ Medullary thyroid cancer      Recommend the following at this time  labs to be done as ordered   - check blood sugars 1-2 times a day before meals and at bedtime  Recommend consistent carb diet, continue regular exercise  - referred for diabetes education/medical nutrition therapy   Patient will inquire about coverage of GLP-1 agonist, SGLT2 inhibitors,   Follow-up in 4 to 6 weeks    - Recommended a consistent carbohydrate diet   - weight control and exercise as discussed ( ideal is 30 min, at least 5 times a week)   - home glucose monitoring and goals emphasized, requested patient bring in glucose monitor or log sheets to next visit   - discussed signs and symptoms of hypoglycemia and how to correct them appropriately  - counseled about the long term complications of uncontrolled diabetes, including, Nephropathy, Neuropathy, CVD, Retinopathy and importance  of adherence to diet, treatment plan and life style modifications   - importance of following up with Opthalmology and podiatry   - glycohemoglobin and other lab monitoring discussed, A1c goal value reviewed  - long term diabetic complications discussed  - labs immediately prior to next visit     3. Hyperlipidemia  - continue statin therapy    4.  Hypertension  Blood pressure high in clinic today, has not taken any medication this morning; prior visits at goal - continue current medications including ACE-I/ ARB      CC: Diabetes    History of Present Illness     HPI:  Fish Riley is a 66 y.o. male presents for a new visit regarding diabetes management. Also has a h/o hypertension, hyperlipidemia      DM history:   Diagnosed in the late 80s (age 32-26),  made lifestyle changes and was bale to discontinue medications until 2006, has been on medications   On insulin since the last few months (2023)  FH: mother, both grandmothers, paternal aunt   No known complications of CAD/ CVA/CKD  Cath negative in 2020  Last Eye exam: within the last 1 year,  ,mild retinopathy per patient     Current regimen:   Metformin 1 mg orally twice a day  Basaglar 20 units subcutaneously at bedtime    Has used bydureon in the past which he says helped a lot     Statin: Atorvastatin 20  ACE-I/ARB:losartan     Appetite is good. Denies recent weight gain/ loss. Lost weight in 2020, regained after a year   Has met nutritionists in the past, avoids sugary drinks, not following a CC diet   Some numbness or tingling in the feet. blurriness in vision that waxes and wanes. No chest pain/ SOB. No diarrhea/ constipation. No urinary complaints. Exercise:   4-5 times a week, 1 hr stationary bike     Home glucose monitoring: does not check   Symptoms of hypoglycemia :  none recently     All other systems were reviewed and are negative.     Review of Systems      Historical Information   Past Medical History:   Diagnosis Date    Diabetes mellitus ([address])     High cholesterol     Hypertension      Past Surgical History:   Procedure Laterality Date    CARDIAC CATHETERIZATION       Social History   Social History     Substance and Sexual Activity   Alcohol Use Yes    Comment: rare     Social History     Substance and Sexual Activity   Drug Use Never     Social History     Tobacco Use   Smoking Status Never   Smokeless Tobacco Never     Family History:   Family History   Problem Relation Age of Onset Diabetes type II Mother     Hypertension Father     Diabetes type II Paternal Aunt        Meds/Allergies   Current Outpatient Medications   Medication Sig Dispense Refill    aspirin (ECOTRIN LOW STRENGTH) 81 mg EC tablet Take 81 mg by mouth daily      atorvastatin (LIPITOR) 20 mg tablet Take 1 tablet (20 mg total) by mouth daily at bedtime 90 tablet 1    B-D ULTRAFINE III SHORT PEN 31G X 8 MM MISC       Cholecalciferol 25 MCG (1000 UT) capsule Take 5,000 Units by mouth      Insulin Glargine Solostar (Basaglar KwikPen) 100 UNIT/ML SOPN Inject 0.2 mL (20 Units total) under the skin daily at bedtime 15 mL 1    losartan (COZAAR) 100 MG tablet TAKE ONE TABLET BY MOUTH EVERY DAY IN THE MORNING 90 tablet 1    metFORMIN (GLUCOPHAGE) 1000 MG tablet Take 1 tablet (1,000 mg total) by mouth 2 (two) times a day with meals 180 tablet 3    metoprolol tartrate (LOPRESSOR) 25 mg tablet Take 1 tablet (25 mg total) by mouth 2 (two) times a day 180 tablet 1    Omega-3 Fatty Acids (fish oil) 1,000 mg Take 1 capsule by mouth 2 (two) times a day      omeprazole (PriLOSEC) 20 mg delayed release capsule Take 1 capsule (20 mg total) by mouth in the morning 90 capsule 1    Zinc 50 MG TABS Take 50 mg by mouth in the morning      glipiZIDE (GLUCOTROL) 5 mg tablet Take 1 tablet (5 mg total) by mouth 2 (two) times a day before meals (Patient not taking: Reported on 11/29/2023) 180 tablet 3    Lancets (onetouch ultrasoft) lancets Use as instructed 100 each 1    NEEDLE, DISP, 30 G (BD Disp Needles) 30G X 1/2" MISC Use daily at bedtime 100 each 0     No current facility-administered medications for this visit. Allergies   Allergen Reactions    Gadolinium Derivatives Angioedema    Penicillins Rash       Objective   Vitals: Blood pressure 160/100, pulse 80, height 5' 10" (1.778 m), weight 90.7 kg (200 lb), SpO2 98 %. Physical Exam  Constitutional:       General: He is not in acute distress. Appearance: He is well-developed.  He is not diaphoretic. HENT:      Head: Normocephalic and atraumatic. Eyes:      Conjunctiva/sclera: Conjunctivae normal.      Pupils: Pupils are equal, round, and reactive to light. Cardiovascular:      Rate and Rhythm: Normal rate and regular rhythm. Heart sounds: Normal heart sounds. No murmur heard. Pulmonary:      Effort: Pulmonary effort is normal. No respiratory distress. Breath sounds: Normal breath sounds. No wheezing. Abdominal:      General: There is no distension. Palpations: Abdomen is soft. Tenderness: There is no abdominal tenderness. There is no guarding. Musculoskeletal:      Cervical back: Normal range of motion and neck supple. Skin:     General: Skin is warm and dry. Findings: No erythema or rash. Neurological:      Mental Status: He is alert and oriented to person, place, and time. Psychiatric:         Behavior: Behavior normal.         Thought Content: Thought content normal.       The history was obtained from the review of the chart, patient. Lab Results:   Lab Results   Component Value Date/Time    Hemoglobin A1C 10.2 (A) 10/20/2023 12:56 PM    Hemoglobin A1C 11.1 (H) 06/23/2023 12:25 PM    White Blood Cell Count 9.0 06/23/2023 12:25 PM    Hemoglobin 16.9 06/23/2023 12:25 PM    HCT 48.5 06/23/2023 12:25 PM    MCV 86 06/23/2023 12:25 PM    Platelet Count 015 74/21/9237 12:25 PM    BUN 18 06/23/2023 12:25 PM    Potassium 4.8 06/23/2023 12:25 PM    Chloride 93 (L) 06/23/2023 12:25 PM    CO2 22 06/23/2023 12:25 PM    Creatinine 1.24 06/23/2023 12:25 PM    AST 21 06/23/2023 12:25 PM    ALT 20 06/23/2023 12:25 PM    Protein, Total 7.1 06/23/2023 12:25 PM    Albumin 4.4 06/23/2023 12:25 PM    Globulin, Total 2.7 06/23/2023 12:25 PM    HDL 37 (L) 06/23/2023 12:25 PM    Triglycerides 200 (H) 06/23/2023 12:25 PM           Imaging Studies: I have personally reviewed pertinent reports. Portions of the record may have been created with voice recognition software. Occasional wrong word or "sound a like" substitutions may have occurred due to the inherent limitations of voice recognition software. Read the chart carefully and recognize, using context, where substitutions have occurred.

## 2023-12-14 ENCOUNTER — TELEPHONE (OUTPATIENT)
Age: 58
End: 2023-12-14

## 2023-12-14 ENCOUNTER — OFFICE VISIT (OUTPATIENT)
Dept: URGENT CARE | Facility: CLINIC | Age: 58
End: 2023-12-14

## 2023-12-14 VITALS
DIASTOLIC BLOOD PRESSURE: 100 MMHG | OXYGEN SATURATION: 97 % | TEMPERATURE: 98.3 F | BODY MASS INDEX: 29.55 KG/M2 | SYSTOLIC BLOOD PRESSURE: 153 MMHG | WEIGHT: 206.4 LBS | HEIGHT: 70 IN | RESPIRATION RATE: 18 BRPM | HEART RATE: 85 BPM

## 2023-12-14 DIAGNOSIS — R05.1 ACUTE COUGH: Primary | ICD-10-CM

## 2023-12-14 RX ORDER — AZITHROMYCIN 250 MG/1
TABLET, FILM COATED ORAL
Qty: 6 TABLET | Refills: 0 | Status: SHIPPED | OUTPATIENT
Start: 2023-12-14 | End: 2023-12-18

## 2023-12-14 RX ORDER — BENZONATATE 100 MG/1
100 CAPSULE ORAL 3 TIMES DAILY PRN
Qty: 20 CAPSULE | Refills: 0 | Status: SHIPPED | OUTPATIENT
Start: 2023-12-14

## 2023-12-14 NOTE — PROGRESS NOTES
[facility]        NAME: Kiko Dickerson is a 66 y.o. male  : 1965    MRN: [id number]  DATE: 2023  TIME: 5:24 PM    Assessment and Plan   Acute cough [R05.1]  1. Acute cough  benzonatate (TESSALON PERLES) 100 mg capsule    azithromycin (ZITHROMAX) 250 mg tablet            Patient Instructions     Cough:   -Will treat persistent coughing with tessalon perles and Zithromax 250mg taken as prescribed with food and a probiotic.   -Stay very well hydrated and push fluids  -Run a humidifier by your bed and take steam showers to clear mucus   -Zicam nasal AllClear can be soothing to the nasal passages   -You can use flonase once daily for two weeks   -Advil or Tylenol for fever or pain. -Mucinex OTC or Zyrtec or Claritin. Drink plenty of water with the mucinex.   -Honey or throat lozenges for cough may be helpful  -Warm salt water gargles and tea with honey   -Sleep with a few pillows propping you up at night to aid with coughing   -Obtain a pulse ox device and check your O2 1-2 times a day. You want your oxygen levels to be >93%. If they go below 93% go to the ED immediately. -Vitamin C 500mg, Vitamin D 2000IU daily. You can attempt to use zinc or Zicam up to 30mg per day. -If your sx worsen or persist follow up with your PCP for recheck. Red flag signs discussed. Follow up with PCP in 3-5 days. Proceed to  ER if symptoms worsen. Chief Complaint     Chief Complaint   Patient presents with    Cold Like Symptoms     Pt here ill  2 weeks pt states s/s  cough. Pt used no  meds. No Covid test done. History of Present Illness       The patient is a 77-year-old male who presents today for a two week hx of worsening coughing. He states that the coughing is productive of a green sputum. He states that his sx have not improved at all in the last 14 days. No fever, chills, body aches. No dyspnea, wheezing, chest tightness, cp, palpitations. No dizziness or weakness.  No GI sx. Good PO intake. No loss of taste or smell. No lower extremity edema. No hx of asthma or smoking. No known sick contacts or recent travel. No OTC measures. Review of Systems   Review of Systems   Constitutional:  Positive for fatigue. Negative for activity change, appetite change, chills, diaphoresis and fever. HENT:  Positive for congestion and postnasal drip. Negative for ear discharge, ear pain, facial swelling, hearing loss, rhinorrhea, sinus pressure, sinus pain, sore throat, tinnitus, trouble swallowing and voice change. Eyes:  Negative for visual disturbance. Respiratory:  Positive for cough. Negative for apnea, chest tightness, shortness of breath, wheezing and stridor. Cardiovascular:  Negative for chest pain, palpitations and leg swelling. Gastrointestinal:  Negative for abdominal distention and abdominal pain. Genitourinary:  Negative for decreased urine volume. Musculoskeletal:  Negative for arthralgias, joint swelling, myalgias, neck pain and neck stiffness. Skin:  Negative for rash. Allergic/Immunologic: Negative for immunocompromised state. Neurological:  Negative for dizziness, weakness, light-headedness, numbness and headaches. Hematological:  Negative for adenopathy.          Current Medications       Current Outpatient Medications:     aspirin (ECOTRIN LOW STRENGTH) 81 mg EC tablet, Take 81 mg by mouth daily, Disp: , Rfl:     atorvastatin (LIPITOR) 20 mg tablet, Take 1 tablet (20 mg total) by mouth daily at bedtime, Disp: 90 tablet, Rfl: 1    azithromycin (ZITHROMAX) 250 mg tablet, Take 2 tablets today then 1 tablet daily x 4 days, Disp: 6 tablet, Rfl: 0    B-D ULTRAFINE III SHORT PEN 31G X 8 MM MISC, , Disp: , Rfl:     benzonatate (TESSALON PERLES) 100 mg capsule, Take 1 capsule (100 mg total) by mouth 3 (three) times a day as needed for cough, Disp: 20 capsule, Rfl: 0    Blood Glucose Monitoring Suppl (OneTouch Verio) w/Device KIT, Use 2 (two) times a day, Disp: 1 kit, Rfl: 0    Cholecalciferol 25 MCG (1000 UT) capsule, Take 5,000 Units by mouth, Disp: , Rfl:     glucose blood (OneTouch Verio) test strip, Use as instructed, Disp: 100 strip, Rfl: 2    Insulin Glargine Solostar (Basaglar KwikPen) 100 UNIT/ML SOPN, Inject 0.2 mL (20 Units total) under the skin daily at bedtime, Disp: 15 mL, Rfl: 1    Lancets (onetouch ultrasoft) lancets, Use as instructed, Disp: 100 each, Rfl: 1    Lancets (onetouch ultrasoft) lancets, Use as instructed, Disp: 100 each, Rfl: 3    losartan (COZAAR) 100 MG tablet, TAKE ONE TABLET BY MOUTH EVERY DAY IN THE MORNING, Disp: 90 tablet, Rfl: 1    metoprolol tartrate (LOPRESSOR) 25 mg tablet, Take 1 tablet (25 mg total) by mouth 2 (two) times a day, Disp: 180 tablet, Rfl: 1    NEEDLE, DISP, 30 G (BD Disp Needles) 30G X 1/2" MISC, Use daily at bedtime, Disp: 100 each, Rfl: 0    Omega-3 Fatty Acids (fish oil) 1,000 mg, Take 1 capsule by mouth 2 (two) times a day, Disp: , Rfl:     omeprazole (PriLOSEC) 20 mg delayed release capsule, Take 1 capsule (20 mg total) by mouth in the morning, Disp: 90 capsule, Rfl: 1    Zinc 50 MG TABS, Take 50 mg by mouth in the morning, Disp: , Rfl:     metFORMIN (GLUCOPHAGE) 1000 MG tablet, Take 1 tablet (1,000 mg total) by mouth 2 (two) times a day with meals, Disp: 180 tablet, Rfl: 3    Current Allergies     Allergies as of 12/14/2023 - Reviewed 12/14/2023   Allergen Reaction Noted    Amoxicillin Rash 12/14/2023    Gadolinium derivatives Angioedema 03/28/2020    Penicillins Rash 01/22/2019            The following portions of the patient's history were reviewed and updated as appropriate: allergies, current medications, past family history, past medical history, past social history, past surgical history and problem list.     Past Medical History:   Diagnosis Date    Diabetes mellitus ([address])     High cholesterol     Hypertension        Past Surgical History:   Procedure Laterality Date    CARDIAC CATHETERIZATION Family History   Problem Relation Age of Onset    Diabetes type II Mother     Hypertension Father     Diabetes type II Paternal Aunt          Medications have been verified. Objective   /100   Pulse 85   Temp 98.3 °F (36.8 °C)   Resp 18   Ht 5' 10" (1.778 m)   Wt 93.6 kg (206 lb 6.4 oz)   SpO2 97%   BMI 29.62 kg/m²   No LMP for male patient. Physical Exam     Physical Exam  Vitals and nursing note reviewed. Constitutional:       General: He is not in acute distress. Appearance: He is well-developed. He is not ill-appearing, toxic-appearing or diaphoretic. HENT:      Head: Normocephalic and atraumatic. Right Ear: Hearing, tympanic membrane, ear canal and external ear normal.      Left Ear: Hearing, tympanic membrane, ear canal and external ear normal.      Nose: Nose normal. No mucosal edema or rhinorrhea. Right Sinus: No maxillary sinus tenderness or frontal sinus tenderness. Left Sinus: No maxillary sinus tenderness or frontal sinus tenderness. Mouth/Throat:      Pharynx: Uvula midline. No oropharyngeal exudate, posterior oropharyngeal erythema or uvula swelling. Tonsils: No tonsillar abscesses. Cardiovascular:      Rate and Rhythm: Normal rate and regular rhythm. Heart sounds: S1 normal and S2 normal. Heart sounds not distant. No murmur heard. No friction rub. No gallop. No S3 or S4 sounds. Pulmonary:      Effort: No tachypnea, bradypnea, accessory muscle usage or respiratory distress. Breath sounds: No decreased breath sounds, wheezing, rhonchi or rales. Comments: Course breath sounds bilaterally   Musculoskeletal:      Cervical back: Normal range of motion and neck supple. Lymphadenopathy:      Cervical: No cervical adenopathy. Neurological:      Mental Status: He is alert and oriented to person, place, and time.    Psychiatric:         Behavior: Behavior normal.

## 2023-12-14 NOTE — PATIENT INSTRUCTIONS
Cough: -Will treat persistent coughing with tessalon perles and Zithromax 250mg taken as prescribed with food and a probiotic.   -Stay very well hydrated and push fluids  -Run a humidifier by your bed and take steam showers to clear mucus   -Zicam nasal AllClear can be soothing to the nasal passages   -You can use flonase once daily for two weeks   -Advil or Tylenol for fever or pain. -Mucinex OTC or Zyrtec or Claritin. Drink plenty of water with the mucinex.   -Honey or throat lozenges for cough may be helpful  -Warm salt water gargles and tea with honey   -Sleep with a few pillows propping you up at night to aid with coughing   -Obtain a pulse ox device and check your O2 1-2 times a day. You want your oxygen levels to be >93%. If they go below 93% go to the ED immediately. -Vitamin C 500mg, Vitamin D 2000IU daily. You can attempt to use zinc or Zicam up to 30mg per day. -If your sx worsen or persist follow up with your PCP for recheck. Red flag signs discussed.

## 2023-12-14 NOTE — TELEPHONE ENCOUNTER
Patient called to request for a virtual appointment today, I spoke with Alexandrea Marley in the office she stated there is no opening or availability to overbook. Referred him to the urgent care. I advised patient the information, he will see about the urgent care if not will call back to schedule something next week.  Confirm the  Care now - [facility] is open Monday through Friday 8 am to 6 pm.

## 2024-01-23 ENCOUNTER — OFFICE VISIT (OUTPATIENT)
Dept: FAMILY MEDICINE CLINIC | Facility: CLINIC | Age: 59
End: 2024-01-23
Payer: MEDICAID

## 2024-01-23 ENCOUNTER — TELEPHONE (OUTPATIENT)
Age: 59
End: 2024-01-23

## 2024-01-23 VITALS
HEART RATE: 67 BPM | WEIGHT: 203.2 LBS | BODY MASS INDEX: 29.09 KG/M2 | DIASTOLIC BLOOD PRESSURE: 82 MMHG | SYSTOLIC BLOOD PRESSURE: 138 MMHG | RESPIRATION RATE: 16 BRPM | OXYGEN SATURATION: 99 % | TEMPERATURE: 97.7 F | HEIGHT: 70 IN

## 2024-01-23 DIAGNOSIS — Z79.4 TYPE 2 DIABETES MELLITUS WITH HYPERGLYCEMIA, WITH LONG-TERM CURRENT USE OF INSULIN (HCC): Primary | ICD-10-CM

## 2024-01-23 DIAGNOSIS — I10 ESSENTIAL HYPERTENSION: ICD-10-CM

## 2024-01-23 DIAGNOSIS — E78.2 MIXED HYPERLIPIDEMIA: ICD-10-CM

## 2024-01-23 DIAGNOSIS — E11.65 TYPE 2 DIABETES MELLITUS WITH HYPERGLYCEMIA, WITH LONG-TERM CURRENT USE OF INSULIN (HCC): Primary | ICD-10-CM

## 2024-01-23 DIAGNOSIS — E11.9 TYPE 2 DIABETES MELLITUS WITHOUT COMPLICATION, WITHOUT LONG-TERM CURRENT USE OF INSULIN (HCC): ICD-10-CM

## 2024-01-23 LAB — SL AMB POCT HEMOGLOBIN AIC: 10.3 (ref ?–6.5)

## 2024-01-23 PROCEDURE — 83036 HEMOGLOBIN GLYCOSYLATED A1C: CPT | Performed by: STUDENT IN AN ORGANIZED HEALTH CARE EDUCATION/TRAINING PROGRAM

## 2024-01-23 PROCEDURE — 99214 OFFICE O/P EST MOD 30 MIN: CPT | Performed by: STUDENT IN AN ORGANIZED HEALTH CARE EDUCATION/TRAINING PROGRAM

## 2024-01-23 RX ORDER — INSULIN GLARGINE 100 [IU]/ML
30 INJECTION, SOLUTION SUBCUTANEOUS
Qty: 15 ML | Refills: 1 | Status: SHIPPED | OUTPATIENT
Start: 2024-01-23

## 2024-01-23 RX ORDER — GLIPIZIDE 5 MG/1
5 TABLET, FILM COATED, EXTENDED RELEASE ORAL DAILY
Qty: 90 TABLET | Refills: 1 | Status: SHIPPED | OUTPATIENT
Start: 2024-01-23

## 2024-01-23 NOTE — TELEPHONE ENCOUNTER
Pharmacist called and stated the Glargine is not covered under patients insurance.She stated the semaglutide might be covered.

## 2024-01-23 NOTE — PROGRESS NOTES
Clinic Visit Note  Yovani Guzmán 58 y.o. male   MRN: [id number]    Assessment and Plan      Diagnoses and all orders for this visit:    Type 2 diabetes mellitus with hyperglycemia, with long-term current use of insulin (HCC)  Hemoglobin A1c remains 10.3%, recommend addition of lipids diet extended release, continue metformin, will increase glargine to 30 units nightly, also recommend follow-up with endocrinology, may benefit from GLP-1 if insurance covers at next visit.  -     POCT hemoglobin A1c  -     Microalbumin,Urine; Future  -     Microalbumin,Urine  -     glipiZIDE (GLUCOTROL XL) 5 mg 24 hr tablet; Take 1 tablet (5 mg total) by mouth daily    Essential hypertension  Blood pressure appropriate on exam today, continue antihypertensive regimen    Mixed hyperlipidemia  Continue statin therapy    Type 2 diabetes mellitus without complication, without long-term current use of insulin (HCC)  -     Insulin Glargine Solostar (Basaglar KwikPen) 100 UNIT/ML SOPN; Inject 0.3 mL (30 Units total) under the skin daily at bedtime    My impressions and treatment recommendations were discussed in detail with the patient who verbalized understanding and had no further questions.  Discharge instructions were provided.    Subjective     Chief Complaint: F/U    History of Present Illness:    Patient is a 58-year-old male coming in for follow-up on chronic disease management, no acute concerns today, patient notes compliance with medication.    The following portions of the patient's history were reviewed and updated as appropriate: allergies, current medications, past family history, past medical history, past social history, past surgical history and problem list.    REVIEW OF SYSTEMS:  A complete 12-point review of systems is negative other than that noted in the HPI.    Review of Systems   Constitutional:  Negative for chills, fatigue and fever.   HENT:  Negative for congestion and sore throat.    Eyes:  Negative for pain  and visual disturbance.   Respiratory:  Negative for shortness of breath and wheezing.    Cardiovascular:  Negative for chest pain and palpitations.   Gastrointestinal:  Negative for abdominal pain, constipation, diarrhea, nausea and vomiting.   Genitourinary:  Negative for dysuria and frequency.   Musculoskeletal:  Negative for back pain and neck pain.   Skin:  Negative for color change and rash.   Neurological:  Negative for dizziness and headaches.   Psychiatric/Behavioral:  Negative for agitation and confusion.    All other systems reviewed and are negative.        Current Outpatient Medications:   •  aspirin (ECOTRIN LOW STRENGTH) 81 mg EC tablet, Take 81 mg by mouth daily, Disp: , Rfl:   •  atorvastatin (LIPITOR) 20 mg tablet, Take 1 tablet (20 mg total) by mouth daily at bedtime, Disp: 90 tablet, Rfl: 1  •  B-D ULTRAFINE III SHORT PEN 31G X 8 MM MISC, , Disp: , Rfl:   •  Blood Glucose Monitoring Suppl (OneTouch Verio) w/Device KIT, Use 2 (two) times a day, Disp: 1 kit, Rfl: 0  •  Cholecalciferol 25 MCG (1000 UT) capsule, Take 5,000 Units by mouth, Disp: , Rfl:   •  glipiZIDE (GLUCOTROL XL) 5 mg 24 hr tablet, Take 1 tablet (5 mg total) by mouth daily, Disp: 90 tablet, Rfl: 1  •  glucose blood (OneTouch Verio) test strip, Use as instructed, Disp: 100 strip, Rfl: 2  •  Insulin Glargine Solostar (Basaglar KwikPen) 100 UNIT/ML SOPN, Inject 0.3 mL (30 Units total) under the skin daily at bedtime, Disp: 15 mL, Rfl: 1  •  Lancets (onetouch ultrasoft) lancets, Use as instructed, Disp: 100 each, Rfl: 1  •  losartan (COZAAR) 100 MG tablet, TAKE ONE TABLET BY MOUTH EVERY DAY IN THE MORNING, Disp: 90 tablet, Rfl: 1  •  metFORMIN (GLUCOPHAGE) 1000 MG tablet, Take 1 tablet (1,000 mg total) by mouth 2 (two) times a day with meals, Disp: 180 tablet, Rfl: 3  •  metoprolol tartrate (LOPRESSOR) 25 mg tablet, Take 1 tablet (25 mg total) by mouth 2 (two) times a day, Disp: 180 tablet, Rfl: 1  •  NEEDLE, DISP, 30 G (BD Disp  "Needles) 30G X 1/2\" MISC, Use daily at bedtime, Disp: 100 each, Rfl: 0  •  Omega-3 Fatty Acids (fish oil) 1,000 mg, Take 1 capsule by mouth 2 (two) times a day, Disp: , Rfl:   •  omeprazole (PriLOSEC) 20 mg delayed release capsule, Take 1 capsule (20 mg total) by mouth in the morning, Disp: 90 capsule, Rfl: 1  •  Zinc 50 MG TABS, Take 50 mg by mouth in the morning, Disp: , Rfl:   Past Medical History:   Diagnosis Date   • Diabetes mellitus (HCC)    • High cholesterol    • Hypertension      Past Surgical History:   Procedure Laterality Date   • CARDIAC CATHETERIZATION       Social History     Socioeconomic History   • Marital status: Single     Spouse name: Not on file   • Number of children: 0   • Years of education: Not on file   • Highest education level: Not on file   Occupational History   • Not on file   Tobacco Use   • Smoking status: Never   • Smokeless tobacco: Never   Vaping Use   • Vaping status: Never Used   Substance and Sexual Activity   • Alcohol use: Not Currently     Comment: rare   • Drug use: Never   • Sexual activity: Not Currently   Other Topics Concern   • Not on file   Social History Narrative   • Not on file     Social Determinants of Health     Financial Resource Strain: Not on file   Food Insecurity: Not on file   Transportation Needs: Not on file   Physical Activity: Not on file   Stress: Not on file   Social Connections: Not on file   Intimate Partner Violence: Not on file   Housing Stability: Not on file     Family History   Problem Relation Age of Onset   • Diabetes type II Mother    • Hypertension Father    • Diabetes type II Paternal Aunt      Allergies   Allergen Reactions   • Amoxicillin Rash   • Gadolinium Derivatives Angioedema   • Penicillins Rash       Objective     Vitals:    01/23/24 1136   BP: 138/82   BP Location: Left arm   Patient Position: Sitting   Cuff Size: Large   Pulse: 67   Resp: 16   Temp: 97.7 °F (36.5 °C)   SpO2: 99%   Weight: 92.2 kg (203 lb 3.2 oz)   Height: 5' " "10\" (1.778 m)       Physical Exam:     GENERAL: NAD, pleasant   HEENT:  NC/AT, PERRL, EOMI, no scleral icterus  CARDIAC:  RRR, +S1/S2, no S3/S4 appreciated, no m/g/r  PULMONARY:  CTA B/L, no wheezing/rales/rhonci, non-labored breathing  ABDOMEN:  Soft, NT/ND, no rebound/guarding/rigidity  Extremities:. No edema, cyanosis, or clubbing  Musculoskeletal:  Full range of motion intact in all extremities   NEUROLOGIC: Grossly intact, no focal deficits  SKIN:  No rashes or erythema noted on exposed skin  Psych: Normal affect, mood stable    ==  PLEASE NOTE:  This encounter was completed utilizing the [employer] Direct Speech Voice Recognition Software. Grammatical errors, random word insertions, pronoun errors and incomplete sentences are occasional consequences of the system due to software limitations, ambient noise and hardware issues.These may be missed by proof reading prior to affixing electronic signature. Any questions or concerns about the content, text or information contained within the body of this dictation should be directly addressed to the physician for clarification. Please do not hesitate to call me directly if you have any any questions or concerns.    Pollo Diez, DO  [facility] Internal Medicine   [facility]     "

## 2024-01-24 NOTE — TELEPHONE ENCOUNTER
Pharmacist was unable to give an alternative.  I printed [facility]'s formulary list for diabetes therapy and placed in your folder

## 2024-01-25 LAB — MICROALBUMIN UR-MCNC: 5786.4 UG/ML

## 2024-01-26 ENCOUNTER — TELEPHONE (OUTPATIENT)
Dept: OTHER | Facility: OTHER | Age: 59
End: 2024-01-26

## 2024-01-26 DIAGNOSIS — Z79.4 TYPE 2 DIABETES MELLITUS WITH HYPERGLYCEMIA, WITH LONG-TERM CURRENT USE OF INSULIN (HCC): Primary | ICD-10-CM

## 2024-01-26 DIAGNOSIS — E11.65 TYPE 2 DIABETES MELLITUS WITH HYPERGLYCEMIA, WITH LONG-TERM CURRENT USE OF INSULIN (HCC): Primary | ICD-10-CM

## 2024-01-26 NOTE — TELEPHONE ENCOUNTER
[facility] pharmacist called, stated that Insulin Glargine is not covered by insurance. Pharmacy suggested to initiate PA or prescribe alternative as Humulin N, Novolin N or Humulin 7030. Please follow up.

## 2024-01-30 NOTE — TELEPHONE ENCOUNTER
PA for Insulin Glargine-Lixisenatide (Insulin Glargine-Lixisenatide) 100 units-33 mcg/mL injection pen (Soliqua)     Submitted via  [x]CMM-KEY: [id number]   []Surescripts-Case ID #   []Faxed to plan   []Other website   []Phone call Case ID #     Office notes sent, clinical questions answered. Awaiting determination

## 2024-01-30 NOTE — TELEPHONE ENCOUNTER
PA for Insulin Glargine-Lixisenatide (Insulin Glargine-Lixisenatide) 100 units-33 mcg/mL injection pen (Soliqua) Approved     Date(s) approved 1- until further notice        Patient advised by [x] [employer] Message                      [] Phone call       Pharmacy advised by [x]Fax                                     []Phone call    Approval letter scanned into Media Yes

## 2024-02-06 ENCOUNTER — TELEPHONE (OUTPATIENT)
Age: 59
End: 2024-02-06

## 2024-02-06 NOTE — TELEPHONE ENCOUNTER
Patient called the RX Refill Line.  Message is being forwarded to the office.     Patient is requesting   Insulin Degludec (Tresiba) 100 unit/mL Pen    His insurance no longer covers his Basaglar KwikPen    Please contact patient at   [phone]

## 2024-02-08 DIAGNOSIS — E11.65 TYPE 2 DIABETES MELLITUS WITH HYPERGLYCEMIA, WITH LONG-TERM CURRENT USE OF INSULIN (HCC): Primary | ICD-10-CM

## 2024-02-08 DIAGNOSIS — Z79.4 TYPE 2 DIABETES MELLITUS WITH HYPERGLYCEMIA, WITH LONG-TERM CURRENT USE OF INSULIN (HCC): Primary | ICD-10-CM

## 2024-02-08 RX ORDER — INSULIN DEGLUDEC 100 U/ML
25 INJECTION, SOLUTION SUBCUTANEOUS DAILY
Qty: 15 ML | Refills: 1 | Status: SHIPPED | OUTPATIENT
Start: 2024-02-08

## 2024-02-08 NOTE — TELEPHONE ENCOUNTER
Called patient. He still has about 2 weeks worth of basaglar quick pen insulin garglin injection 3 ml. He will stop this med and start new insulin that was prescribed.

## 2024-02-19 DIAGNOSIS — Z79.4 TYPE 2 DIABETES MELLITUS WITH HYPERGLYCEMIA, WITH LONG-TERM CURRENT USE OF INSULIN (HCC): Primary | ICD-10-CM

## 2024-02-19 DIAGNOSIS — E11.65 TYPE 2 DIABETES MELLITUS WITH HYPERGLYCEMIA, WITH LONG-TERM CURRENT USE OF INSULIN (HCC): Primary | ICD-10-CM

## 2024-02-19 RX ORDER — PEN NEEDLE, DIABETIC 31 GX5/16"
NEEDLE, DISPOSABLE MISCELLANEOUS
Qty: 100 EACH | Refills: 1 | Status: SHIPPED | OUTPATIENT
Start: 2024-02-19

## 2024-02-19 NOTE — TELEPHONE ENCOUNTER
Reason for call:   [x] Refill   [] Prior Auth  [] Other:     Office:   [x] PCP/Provider - [facility]  [] Specialty/Provider -     Medication: BD Ultrafine Pen Needles 31G X 8 MM    Quantity: 100    Pharmacy:   [facility] ([id number] - [place], [place] - [address]  P: [phone]  F: [phone]    Does the patient have enough for 3 days?   [x] Yes   [] No - Send as HP to POD

## 2024-04-05 ENCOUNTER — OFFICE VISIT (OUTPATIENT)
Dept: FAMILY MEDICINE CLINIC | Facility: CLINIC | Age: 59
End: 2024-04-05
Payer: MEDICAID

## 2024-04-05 VITALS
HEART RATE: 66 BPM | RESPIRATION RATE: 14 BRPM | BODY MASS INDEX: 30.06 KG/M2 | WEIGHT: 210 LBS | OXYGEN SATURATION: 95 % | TEMPERATURE: 97.6 F | DIASTOLIC BLOOD PRESSURE: 90 MMHG | SYSTOLIC BLOOD PRESSURE: 140 MMHG | HEIGHT: 70 IN

## 2024-04-05 DIAGNOSIS — J45.21 MILD INTERMITTENT ASTHMA WITH EXACERBATION: ICD-10-CM

## 2024-04-05 DIAGNOSIS — G89.29 CHRONIC MIDLINE LOW BACK PAIN WITHOUT SCIATICA: ICD-10-CM

## 2024-04-05 DIAGNOSIS — Z79.4 TYPE 2 DIABETES MELLITUS WITH HYPERGLYCEMIA, WITH LONG-TERM CURRENT USE OF INSULIN (HCC): Primary | ICD-10-CM

## 2024-04-05 DIAGNOSIS — M54.50 CHRONIC MIDLINE LOW BACK PAIN WITHOUT SCIATICA: ICD-10-CM

## 2024-04-05 DIAGNOSIS — E11.65 TYPE 2 DIABETES MELLITUS WITH HYPERGLYCEMIA, WITH LONG-TERM CURRENT USE OF INSULIN (HCC): Primary | ICD-10-CM

## 2024-04-05 DIAGNOSIS — I10 ESSENTIAL HYPERTENSION: ICD-10-CM

## 2024-04-05 DIAGNOSIS — E78.2 MIXED HYPERLIPIDEMIA: ICD-10-CM

## 2024-04-05 DIAGNOSIS — K21.9 GASTROESOPHAGEAL REFLUX DISEASE WITHOUT ESOPHAGITIS: ICD-10-CM

## 2024-04-05 PROBLEM — E66.3 OVERWEIGHT (BMI 25.0-29.9): Status: RESOLVED | Noted: 2023-11-29 | Resolved: 2024-04-05

## 2024-04-05 PROCEDURE — 99214 OFFICE O/P EST MOD 30 MIN: CPT | Performed by: STUDENT IN AN ORGANIZED HEALTH CARE EDUCATION/TRAINING PROGRAM

## 2024-04-05 RX ORDER — ALBUTEROL SULFATE 90 UG/1
2 AEROSOL, METERED RESPIRATORY (INHALATION) EVERY 6 HOURS PRN
Qty: 6.7 G | Refills: 5 | Status: SHIPPED | OUTPATIENT
Start: 2024-04-05

## 2024-04-05 NOTE — PROGRESS NOTES
Clinic Visit Note  Yovani Guzmán 58 y.o. male   MRN: [id number]    Assessment and Plan      Diagnoses and all orders for this visit:    Type 2 diabetes mellitus with hyperglycemia, with long-term current use of insulin (HCC)  Continue metformin, glipizide, insulin therapy, long-acting 25 units daily, reevaluate hemoglobin A1c next month, continue lifestyle modifications including low added sugar diet, low processed foods, routine exercise.    Essential hypertension  Continue antihypertensive medication, repeat at next visit    Mixed hyperlipidemia  Continue statin, fish oil supplementation    Mild intermittent asthma with exacerbation  -     albuterol (Proventil HFA) 90 mcg/act inhaler; Inhale 2 puffs every 6 (six) hours as needed for wheezing    Gastroesophageal reflux disease without esophagitis  Continue PPI therapy    Chronic midline low back pain without sciatica    My impressions and treatment recommendations were discussed in detail with the patient who verbalized understanding and had no further questions.  Discharge instructions were provided.    Subjective     Chief Complaint: F/U    History of Present Illness:    Patient is a pleasant 58-year-old male coming in for chronic disease management follow-up.    The following portions of the patient's history were reviewed and updated as appropriate: allergies, current medications, past family history, past medical history, past social history, past surgical history and problem list.    REVIEW OF SYSTEMS:  A complete 12-point review of systems is negative other than that noted in the HPI.    Review of Systems   Constitutional:  Negative for chills, fatigue and fever.   HENT:  Negative for congestion and sore throat.    Eyes:  Negative for pain and visual disturbance.   Respiratory:  Negative for shortness of breath and wheezing.    Cardiovascular:  Negative for chest pain and palpitations.   Gastrointestinal:  Negative for abdominal pain, constipation,  "diarrhea, nausea and vomiting.   Genitourinary:  Negative for dysuria and frequency.   Musculoskeletal:  Negative for back pain and neck pain.   Skin:  Negative for color change and rash.   Neurological:  Negative for dizziness and headaches.   Psychiatric/Behavioral:  Negative for agitation and confusion.    All other systems reviewed and are negative.        Current Outpatient Medications:   •  albuterol (Proventil HFA) 90 mcg/act inhaler, Inhale 2 puffs every 6 (six) hours as needed for wheezing, Disp: 6.7 g, Rfl: 5  •  aspirin (ECOTRIN LOW STRENGTH) 81 mg EC tablet, Take 81 mg by mouth daily, Disp: , Rfl:   •  atorvastatin (LIPITOR) 20 mg tablet, Take 1 tablet (20 mg total) by mouth daily at bedtime, Disp: 90 tablet, Rfl: 1  •  B-D ULTRAFINE III SHORT PEN 31G X 8 MM MISC, Use daily, Disp: 100 each, Rfl: 1  •  Blood Glucose Monitoring Suppl (OneTouch Verio) w/Device KIT, Use 2 (two) times a day, Disp: 1 kit, Rfl: 0  •  Cholecalciferol 25 MCG (1000 UT) capsule, Take 5,000 Units by mouth, Disp: , Rfl:   •  glipiZIDE (GLUCOTROL XL) 5 mg 24 hr tablet, Take 1 tablet (5 mg total) by mouth daily, Disp: 90 tablet, Rfl: 1  •  glucose blood (OneTouch Verio) test strip, Use as instructed, Disp: 100 strip, Rfl: 2  •  Insulin Degludec FlexTouch 100 UNIT/ML SOPN, Inject 25 Units under the skin in the morning, Disp: 15 mL, Rfl: 1  •  Lancets (onetouch ultrasoft) lancets, Use as instructed, Disp: 100 each, Rfl: 1  •  losartan (COZAAR) 100 MG tablet, TAKE ONE TABLET BY MOUTH EVERY DAY IN THE MORNING, Disp: 90 tablet, Rfl: 1  •  metFORMIN (GLUCOPHAGE) 1000 MG tablet, Take 1 tablet (1,000 mg total) by mouth 2 (two) times a day with meals, Disp: 180 tablet, Rfl: 3  •  metoprolol tartrate (LOPRESSOR) 25 mg tablet, Take 1 tablet (25 mg total) by mouth 2 (two) times a day, Disp: 180 tablet, Rfl: 1  •  NEEDLE, DISP, 30 G (BD Disp Needles) 30G X 1/2\" MISC, Use daily at bedtime, Disp: 100 each, Rfl: 0  •  Omega-3 Fatty Acids (fish oil) " "1,000 mg, Take 1 capsule by mouth 2 (two) times a day, Disp: , Rfl:   •  omeprazole (PriLOSEC) 20 mg delayed release capsule, Take 1 capsule (20 mg total) by mouth in the morning, Disp: 90 capsule, Rfl: 1  •  Zinc 50 MG TABS, Take 50 mg by mouth in the morning, Disp: , Rfl:   Past Medical History:   Diagnosis Date   • Diabetes mellitus (HCC)    • High cholesterol    • Hypertension      Past Surgical History:   Procedure Laterality Date   • CARDIAC CATHETERIZATION       Social History     Socioeconomic History   • Marital status: Single     Spouse name: Not on file   • Number of children: 0   • Years of education: Not on file   • Highest education level: Not on file   Occupational History   • Not on file   Tobacco Use   • Smoking status: Never   • Smokeless tobacco: Never   Vaping Use   • Vaping status: Never Used   Substance and Sexual Activity   • Alcohol use: Not Currently     Comment: rare   • Drug use: Never   • Sexual activity: Not Currently   Other Topics Concern   • Not on file   Social History Narrative   • Not on file     Social Determinants of Health     Financial Resource Strain: Not on file   Food Insecurity: Not on file   Transportation Needs: Not on file   Physical Activity: Not on file   Stress: Not on file   Social Connections: Not on file   Intimate Partner Violence: Not on file   Housing Stability: Not on file     Family History   Problem Relation Age of Onset   • Diabetes type II Mother    • Hypertension Father    • Diabetes type II Paternal Aunt      Allergies   Allergen Reactions   • Amoxicillin Rash   • Gadolinium Derivatives Angioedema   • Penicillins Rash       Objective     Vitals:    04/05/24 1059   BP: 140/90   BP Location: Left arm   Patient Position: Sitting   Cuff Size: Adult   Pulse: 66   Resp: 14   Temp: 97.6 °F (36.4 °C)   TempSrc: Temporal   SpO2: 95%   Weight: 95.3 kg (210 lb)   Height: 5' 10\" (1.778 m)       Physical Exam:     GENERAL: NAD, pleasant   HEENT:  NC/AT, PERRL, EOMI, " no scleral icterus  CARDIAC:  RRR, +S1/S2, no S3/S4 appreciated, no m/g/r  PULMONARY:  CTA B/L, no wheezing/rales/rhonci, non-labored breathing  ABDOMEN:  Soft, NT/ND, no rebound/guarding/rigidity  Extremities:. No edema, cyanosis, or clubbing  Musculoskeletal:  Full range of motion intact in all extremities   NEUROLOGIC: Grossly intact, no focal deficits  SKIN:  No rashes or erythema noted on exposed skin  Psych: Normal affect, mood stable    ==  PLEASE NOTE:  This encounter was completed utilizing the [email or web address] Direct Speech Voice Recognition Software. Grammatical errors, random word insertions, pronoun errors and incomplete sentences are occasional consequences of the system due to software limitations, ambient noise and hardware issues.These may be missed by proof reading prior to affixing electronic signature. Any questions or concerns about the content, text or information contained within the body of this dictation should be directly addressed to the physician for clarification. Please do not hesitate to call me directly if you have any any questions or concerns.    Pollo Diez, DO  [facility] Internal Medicine   [facility]

## 2024-04-23 DIAGNOSIS — I10 ESSENTIAL HYPERTENSION: ICD-10-CM

## 2024-04-23 RX ORDER — LOSARTAN POTASSIUM 100 MG/1
100 TABLET ORAL EVERY MORNING
Qty: 90 TABLET | Refills: 1 | Status: SHIPPED | OUTPATIENT
Start: 2024-04-23

## 2024-04-24 DIAGNOSIS — E78.2 MIXED HYPERLIPIDEMIA: ICD-10-CM

## 2024-04-24 DIAGNOSIS — I10 ESSENTIAL HYPERTENSION: ICD-10-CM

## 2024-04-24 DIAGNOSIS — K21.9 GASTROESOPHAGEAL REFLUX DISEASE WITHOUT ESOPHAGITIS: ICD-10-CM

## 2024-04-24 RX ORDER — ATORVASTATIN CALCIUM 20 MG/1
20 TABLET, FILM COATED ORAL
Qty: 90 TABLET | Refills: 0 | Status: SHIPPED | OUTPATIENT
Start: 2024-04-24

## 2024-04-24 RX ORDER — OMEPRAZOLE 20 MG/1
20 CAPSULE, DELAYED RELEASE ORAL DAILY
Qty: 90 CAPSULE | Refills: 1 | Status: SHIPPED | OUTPATIENT
Start: 2024-04-24

## 2024-04-24 NOTE — TELEPHONE ENCOUNTER
Reason for call:   [x] Refill   [] Prior Auth  [] Other:     Office:   [x] PCP/Provider - [facility]   [] Specialty/Provider -         Pharmacy: [id number]     Does the patient have enough for 3 days?   [] Yes   [x] No - Send as HP to POD

## 2024-04-24 NOTE — TELEPHONE ENCOUNTER
Reason for call:   [x] Refill   [] Prior Auth  [] Other:     Office:   [x] PCP/Provider -   [] Specialty/Provider -     Medication: atorvastatin (LIPITOR) 20 mg tablet     Dose/Frequency:     Take 1 tablet (20 mg total) by mouth daily at bedtime     Quantity: #90    Pharmacy: [facility] ([id number] - [place], [place] - [address] [phone]     Does the patient have enough for 3 days?   [x] Yes   [] No - Send as HP to POD

## 2024-05-30 ENCOUNTER — OFFICE VISIT (OUTPATIENT)
Dept: FAMILY MEDICINE CLINIC | Facility: CLINIC | Age: 59
End: 2024-05-30
Payer: MEDICAID

## 2024-05-30 VITALS
OXYGEN SATURATION: 97 % | DIASTOLIC BLOOD PRESSURE: 80 MMHG | RESPIRATION RATE: 14 BRPM | HEIGHT: 70 IN | WEIGHT: 209 LBS | HEART RATE: 68 BPM | TEMPERATURE: 98.5 F | BODY MASS INDEX: 29.92 KG/M2 | SYSTOLIC BLOOD PRESSURE: 132 MMHG

## 2024-05-30 DIAGNOSIS — E11.65 TYPE 2 DIABETES MELLITUS WITH HYPERGLYCEMIA, WITH LONG-TERM CURRENT USE OF INSULIN (HCC): Primary | ICD-10-CM

## 2024-05-30 DIAGNOSIS — G89.29 CHRONIC MIDLINE LOW BACK PAIN WITHOUT SCIATICA: ICD-10-CM

## 2024-05-30 DIAGNOSIS — I10 ESSENTIAL HYPERTENSION: ICD-10-CM

## 2024-05-30 DIAGNOSIS — M54.50 CHRONIC MIDLINE LOW BACK PAIN WITHOUT SCIATICA: ICD-10-CM

## 2024-05-30 DIAGNOSIS — E78.2 MIXED HYPERLIPIDEMIA: ICD-10-CM

## 2024-05-30 DIAGNOSIS — K21.9 GASTROESOPHAGEAL REFLUX DISEASE WITHOUT ESOPHAGITIS: ICD-10-CM

## 2024-05-30 DIAGNOSIS — Z79.4 TYPE 2 DIABETES MELLITUS WITH HYPERGLYCEMIA, WITH LONG-TERM CURRENT USE OF INSULIN (HCC): Primary | ICD-10-CM

## 2024-05-30 LAB — SL AMB POCT HEMOGLOBIN AIC: 8.6 (ref ?–6.5)

## 2024-05-30 PROCEDURE — 83036 HEMOGLOBIN GLYCOSYLATED A1C: CPT | Performed by: STUDENT IN AN ORGANIZED HEALTH CARE EDUCATION/TRAINING PROGRAM

## 2024-05-30 PROCEDURE — 99214 OFFICE O/P EST MOD 30 MIN: CPT | Performed by: STUDENT IN AN ORGANIZED HEALTH CARE EDUCATION/TRAINING PROGRAM

## 2024-05-30 RX ORDER — INSULIN DEGLUDEC 100 U/ML
30 INJECTION, SOLUTION SUBCUTANEOUS DAILY
Qty: 15 ML | Refills: 1 | Status: SHIPPED | OUTPATIENT
Start: 2024-05-30

## 2024-05-30 RX ORDER — OMEPRAZOLE 20 MG/1
20 CAPSULE, DELAYED RELEASE ORAL DAILY
Qty: 90 CAPSULE | Refills: 1 | Status: SHIPPED | OUTPATIENT
Start: 2024-05-30

## 2024-05-30 RX ORDER — ATORVASTATIN CALCIUM 20 MG/1
20 TABLET, FILM COATED ORAL
Qty: 90 TABLET | Refills: 3 | Status: SHIPPED | OUTPATIENT
Start: 2024-05-30

## 2024-05-30 RX ORDER — GLIPIZIDE 5 MG/1
5 TABLET, FILM COATED, EXTENDED RELEASE ORAL DAILY
Qty: 90 TABLET | Refills: 1 | Status: SHIPPED | OUTPATIENT
Start: 2024-05-30

## 2024-05-30 RX ORDER — LOSARTAN POTASSIUM 100 MG/1
100 TABLET ORAL EVERY MORNING
Qty: 90 TABLET | Refills: 1 | Status: SHIPPED | OUTPATIENT
Start: 2024-05-30

## 2024-05-30 NOTE — PROGRESS NOTES
Clinic Visit Note  Yovani Guzmán 58 y.o. male   MRN: [id number]    Assessment and Plan      Diagnoses and all orders for this visit:    Type 2 diabetes mellitus with hyperglycemia, with long-term current use of insulin (HCC)  Improvement hemoglobin A1c 8.6%, recommend increasing long-acting insulin to 30 units daily, continue lifestyle modifications, recheck in 3 months.  -     POCT hemoglobin A1c  -     Insulin Degludec FlexTouch 100 UNIT/ML SOPN; Inject 30 Units under the skin in the morning  -     glipiZIDE (GLUCOTROL XL) 5 mg 24 hr tablet; Take 1 tablet (5 mg total) by mouth daily    Essential hypertension  Blood pressure well-controlled, continue Lopressor and losartan  -     metoprolol tartrate (LOPRESSOR) 25 mg tablet; Take 1 tablet (25 mg total) by mouth 2 (two) times a day  -     losartan (COZAAR) 100 MG tablet; Take 1 tablet (100 mg total) by mouth every morning    Gastroesophageal reflux disease without esophagitis  -     omeprazole (PriLOSEC) 20 mg delayed release capsule; Take 1 capsule (20 mg total) by mouth in the morning    Mixed hyperlipidemia  -     atorvastatin (LIPITOR) 20 mg tablet; Take 1 tablet (20 mg total) by mouth daily at bedtime    Chronic midline low back pain without sciatica  Stable, continue appropriate exercise routine    Type 2 diabetes mellitus without complication, without long-term current use of insulin (HCC)  -     metFORMIN (GLUCOPHAGE) 1000 MG tablet; Take 1 tablet (1,000 mg total) by mouth 2 (two) times a day with meals    My impressions and treatment recommendations were discussed in detail with the patient who verbalized understanding and had no further questions.  Discharge instructions were provided.    Subjective     Chief Complaint: Follow-up visit    History of Present Illness:    Patient is a pleasant 58-year-old male coming in for follow-up visit on chronic disease management.    The following portions of the patient's history were reviewed and updated as  appropriate: allergies, current medications, past family history, past medical history, past social history, past surgical history and problem list.    REVIEW OF SYSTEMS:  A complete 12-point review of systems is negative other than that noted in the HPI.    Review of Systems   Constitutional:  Negative for chills, fatigue and fever.   HENT:  Negative for congestion and sore throat.    Eyes:  Negative for pain and visual disturbance.   Respiratory:  Negative for shortness of breath and wheezing.    Cardiovascular:  Negative for chest pain and palpitations.   Gastrointestinal:  Negative for abdominal pain, constipation, diarrhea, nausea and vomiting.   Genitourinary:  Negative for dysuria and frequency.   Musculoskeletal:  Negative for back pain and neck pain.   Skin:  Negative for color change and rash.   Neurological:  Negative for dizziness and headaches.   Psychiatric/Behavioral:  Negative for agitation and confusion.    All other systems reviewed and are negative.        Current Outpatient Medications:   •  albuterol (Proventil HFA) 90 mcg/act inhaler, Inhale 2 puffs every 6 (six) hours as needed for wheezing, Disp: 6.7 g, Rfl: 5  •  aspirin (ECOTRIN LOW STRENGTH) 81 mg EC tablet, Take 81 mg by mouth daily, Disp: , Rfl:   •  atorvastatin (LIPITOR) 20 mg tablet, Take 1 tablet (20 mg total) by mouth daily at bedtime, Disp: 90 tablet, Rfl: 3  •  B-D ULTRAFINE III SHORT PEN 31G X 8 MM MISC, Use daily, Disp: 100 each, Rfl: 1  •  Blood Glucose Monitoring Suppl (OneTouch Verio) w/Device KIT, Use 2 (two) times a day, Disp: 1 kit, Rfl: 0  •  Cholecalciferol 25 MCG (1000 UT) capsule, Take 5,000 Units by mouth, Disp: , Rfl:   •  glipiZIDE (GLUCOTROL XL) 5 mg 24 hr tablet, Take 1 tablet (5 mg total) by mouth daily, Disp: 90 tablet, Rfl: 1  •  glucose blood (OneTouch Verio) test strip, Use as instructed, Disp: 100 strip, Rfl: 2  •  Insulin Degludec FlexTouch 100 UNIT/ML SOPN, Inject 30 Units under the skin in the morning,  "Disp: 15 mL, Rfl: 1  •  Lancets (onetouch ultrasoft) lancets, Use as instructed, Disp: 100 each, Rfl: 1  •  losartan (COZAAR) 100 MG tablet, Take 1 tablet (100 mg total) by mouth every morning, Disp: 90 tablet, Rfl: 1  •  metFORMIN (GLUCOPHAGE) 1000 MG tablet, Take 1 tablet (1,000 mg total) by mouth 2 (two) times a day with meals, Disp: 180 tablet, Rfl: 3  •  metoprolol tartrate (LOPRESSOR) 25 mg tablet, Take 1 tablet (25 mg total) by mouth 2 (two) times a day, Disp: 180 tablet, Rfl: 1  •  NEEDLE, DISP, 30 G (BD Disp Needles) 30G X 1/2\" MISC, Use daily at bedtime, Disp: 100 each, Rfl: 0  •  Omega-3 Fatty Acids (fish oil) 1,000 mg, Take 1 capsule by mouth 2 (two) times a day, Disp: , Rfl:   •  omeprazole (PriLOSEC) 20 mg delayed release capsule, Take 1 capsule (20 mg total) by mouth in the morning, Disp: 90 capsule, Rfl: 1  •  Zinc 50 MG TABS, Take 50 mg by mouth in the morning, Disp: , Rfl:   Past Medical History:   Diagnosis Date   • Diabetes mellitus (HCC)    • High cholesterol    • Hypertension      Past Surgical History:   Procedure Laterality Date   • CARDIAC CATHETERIZATION       Social History     Socioeconomic History   • Marital status: Single     Spouse name: Not on file   • Number of children: 0   • Years of education: Not on file   • Highest education level: Not on file   Occupational History   • Not on file   Tobacco Use   • Smoking status: Never   • Smokeless tobacco: Never   Vaping Use   • Vaping status: Never Used   Substance and Sexual Activity   • Alcohol use: Not Currently     Comment: rare   • Drug use: Never   • Sexual activity: Not Currently   Other Topics Concern   • Not on file   Social History Narrative   • Not on file     Social Determinants of Health     Financial Resource Strain: Not on file   Food Insecurity: Not on file   Transportation Needs: Not on file   Physical Activity: Not on file   Stress: Not on file   Social Connections: Not on file   Intimate Partner Violence: Not on file " "  Housing Stability: Not on file     Family History   Problem Relation Age of Onset   • Diabetes type II Mother    • Hypertension Father    • Diabetes type II Paternal Aunt      Allergies   Allergen Reactions   • Amoxicillin Rash   • Gadolinium Derivatives Angioedema   • Penicillins Rash       Objective     Vitals:    05/30/24 1057   BP: 132/80   BP Location: Left arm   Patient Position: Sitting   Cuff Size: Adult   Pulse: 68   Resp: 14   Temp: 98.5 °F (36.9 °C)   TempSrc: Temporal   SpO2: 97%   Weight: 94.8 kg (209 lb)   Height: 5' 10\" (1.778 m)       Physical Exam:     GENERAL: NAD, pleasant   HEENT:  NC/AT, PERRL, EOMI, no scleral icterus  CARDIAC:  RRR, +S1/S2, no S3/S4 appreciated, no m/g/r  PULMONARY:  CTA B/L, no wheezing/rales/rhonci, non-labored breathing  ABDOMEN:  Soft, NT/ND, no rebound/guarding/rigidity  Extremities:. No edema, cyanosis, or clubbing  Musculoskeletal:  Full range of motion intact in all extremities   NEUROLOGIC: Grossly intact, no focal deficits  SKIN:  No rashes or erythema noted on exposed skin  Psych: Normal affect, mood stable    ==  PLEASE NOTE:  This encounter was completed utilizing the [employer] Direct Speech Voice Recognition Software. Grammatical errors, random word insertions, pronoun errors and incomplete sentences are occasional consequences of the system due to software limitations, ambient noise and hardware issues.These may be missed by proof reading prior to affixing electronic signature. Any questions or concerns about the content, text or information contained within the body of this dictation should be directly addressed to the physician for clarification. Please do not hesitate to call me directly if you have any any questions or concerns.    Pollo Diez, DO  [facility] Internal Medicine   [facility]     " Port Dimensions-X Axis In Cm: 1.5

## 2024-08-26 DIAGNOSIS — Z79.4 TYPE 2 DIABETES MELLITUS WITH HYPERGLYCEMIA, WITH LONG-TERM CURRENT USE OF INSULIN (HCC): ICD-10-CM

## 2024-08-26 DIAGNOSIS — E11.65 TYPE 2 DIABETES MELLITUS WITH HYPERGLYCEMIA, WITH LONG-TERM CURRENT USE OF INSULIN (HCC): ICD-10-CM

## 2024-08-26 RX ORDER — PEN NEEDLE, DIABETIC 31 GX5/16"
NEEDLE, DISPOSABLE MISCELLANEOUS
Qty: 100 EACH | Refills: 1 | Status: SHIPPED | OUTPATIENT
Start: 2024-08-26

## 2024-08-29 ENCOUNTER — OFFICE VISIT (OUTPATIENT)
Dept: FAMILY MEDICINE CLINIC | Facility: CLINIC | Age: 59
End: 2024-08-29
Payer: MEDICAID

## 2024-08-29 VITALS
SYSTOLIC BLOOD PRESSURE: 130 MMHG | TEMPERATURE: 97.2 F | HEART RATE: 70 BPM | OXYGEN SATURATION: 98 % | HEIGHT: 70 IN | RESPIRATION RATE: 18 BRPM | BODY MASS INDEX: 30.29 KG/M2 | DIASTOLIC BLOOD PRESSURE: 80 MMHG | WEIGHT: 211.6 LBS

## 2024-08-29 DIAGNOSIS — E87.8 ELECTROLYTE ABNORMALITY: ICD-10-CM

## 2024-08-29 DIAGNOSIS — I10 ESSENTIAL HYPERTENSION: ICD-10-CM

## 2024-08-29 DIAGNOSIS — M54.50 CHRONIC MIDLINE LOW BACK PAIN WITHOUT SCIATICA: ICD-10-CM

## 2024-08-29 DIAGNOSIS — K21.9 GASTROESOPHAGEAL REFLUX DISEASE WITHOUT ESOPHAGITIS: ICD-10-CM

## 2024-08-29 DIAGNOSIS — E11.65 TYPE 2 DIABETES MELLITUS WITH HYPERGLYCEMIA, WITH LONG-TERM CURRENT USE OF INSULIN (HCC): ICD-10-CM

## 2024-08-29 DIAGNOSIS — Z13.29 THYROID DISORDER SCREENING: ICD-10-CM

## 2024-08-29 DIAGNOSIS — E78.2 MIXED HYPERLIPIDEMIA: ICD-10-CM

## 2024-08-29 DIAGNOSIS — Z12.5 PROSTATE CANCER SCREENING: ICD-10-CM

## 2024-08-29 DIAGNOSIS — Z13.0 SCREENING, IRON DEFICIENCY ANEMIA: ICD-10-CM

## 2024-08-29 DIAGNOSIS — Z13.220 SCREENING CHOLESTEROL LEVEL: ICD-10-CM

## 2024-08-29 DIAGNOSIS — Z79.4 TYPE 2 DIABETES MELLITUS WITH HYPERGLYCEMIA, WITH LONG-TERM CURRENT USE OF INSULIN (HCC): ICD-10-CM

## 2024-08-29 DIAGNOSIS — E55.9 VITAMIN D DEFICIENCY: ICD-10-CM

## 2024-08-29 DIAGNOSIS — Z00.00 ANNUAL PHYSICAL EXAM: Primary | ICD-10-CM

## 2024-08-29 DIAGNOSIS — G89.29 CHRONIC MIDLINE LOW BACK PAIN WITHOUT SCIATICA: ICD-10-CM

## 2024-08-29 LAB — SL AMB POCT HEMOGLOBIN AIC: 8.6 (ref ?–6.5)

## 2024-08-29 PROCEDURE — 83036 HEMOGLOBIN GLYCOSYLATED A1C: CPT | Performed by: STUDENT IN AN ORGANIZED HEALTH CARE EDUCATION/TRAINING PROGRAM

## 2024-08-29 PROCEDURE — 99396 PREV VISIT EST AGE 40-64: CPT | Performed by: STUDENT IN AN ORGANIZED HEALTH CARE EDUCATION/TRAINING PROGRAM

## 2024-08-29 PROCEDURE — 99214 OFFICE O/P EST MOD 30 MIN: CPT | Performed by: STUDENT IN AN ORGANIZED HEALTH CARE EDUCATION/TRAINING PROGRAM

## 2024-08-29 RX ORDER — INSULIN DEGLUDEC 100 U/ML
20 INJECTION, SOLUTION SUBCUTANEOUS EVERY 12 HOURS
Qty: 15 ML | Refills: 1 | Status: SHIPPED | OUTPATIENT
Start: 2024-08-29

## 2024-08-29 NOTE — PROGRESS NOTES
Adult Annual Physical  Name: Yovani Guzmán      : 1965      MRN: [id number]  Encounter Provider: Pollo Diez DO  Encounter Date: 2024   Encounter department: [facility]    Assessment & Plan   1. Annual physical exam  2. Type 2 diabetes mellitus with hyperglycemia, with long-term current use of insulin (HCC)  -     POCT hemoglobin A1c  -     Insulin Degludec FlexTouch 100 UNIT/ML SOPN; Inject 20 Units under the skin every 12 (twelve) hours  3. Gastroesophageal reflux disease without esophagitis  4. Essential hypertension  5. Chronic midline low back pain without sciatica  6. Mixed hyperlipidemia  -     Lipid panel; Future  -     Lipid panel  7. Thyroid disorder screening  -     TSH, 3rd generation with Free T4 reflex; Future  -     TSH, 3rd generation with Free T4 reflex  8. Screening cholesterol level  9. Screening, iron deficiency anemia  -     CBC and differential; Future  -     CBC and differential  10. Electrolyte abnormality  -     Comprehensive metabolic panel; Future  -     Comprehensive metabolic panel  11. Vitamin D deficiency  -     Vitamin D 25 hydroxy; Future  -     Vitamin D 25 hydroxy  12. Prostate cancer screening  -     PSA, total and free; Future  -     PSA, total and free    Patient is a pleasant 59-year-old male coming in for annual physical/follow-up chronic disease management.  Hemoglobin A1c of 8.6%, recommend switching insulin long-acting to 20 units every 12 hours.  Continue lifestyle modifications including low added sugar diet, low processed foods, increased activity.  Follow-up blood work for next visit.  No other changes to medications, continue at this time.    Immunizations and preventive care screenings were discussed with patient today. Appropriate education was printed on patient's after visit summary.    Discussed risks and benefits of prostate cancer screening. We discussed the controversial history of PSA screening for prostate  cancer in the [place] as well as the risk of over detection and over treatment of prostate cancer by way of PSA screening.  The patient understands that PSA blood testing is an imperfect way to screen for prostate cancer and that elevated PSA levels in the blood may also be caused by infection, inflammation, prostatic trauma or manipulation, urological procedures, or by benign prostatic enlargement.    The role of the digital rectal examination in prostate cancer screening was also discussed and I discussed with him that there is large interobserver variability in the findings of digital rectal examination.    Counseling:  Alcohol/drug use: discussed moderation in alcohol intake, the recommendations for healthy alcohol use, and avoidance of illicit drug use.  Dental Health: discussed importance of regular tooth brushing, flossing, and dental visits.  Injury prevention: discussed safety/seat belts, safety helmets, smoke detectors, carbon dioxide detectors, and smoking near bedding or upholstery.  Sexual health: discussed sexually transmitted diseases, partner selection, use of condoms, avoidance of unintended pregnancy, and contraceptive alternatives.  Exercise: the importance of regular exercise/physical activity was discussed. Recommend exercise 3-5 times per week for at least 30 minutes.       Depression Screening and Follow-up Plan: Patient was screened for depression during today's encounter. They screened negative with a PHQ-2 score of 0.        History of Present Illness     Adult Annual Physical:  Patient presents for annual physical.     Diet and Physical Activity:  - Diet/Nutrition: well balanced diet.  - Exercise: 3-4 times a week on average.    Depression Screening:  - PHQ-2 Score: 0    General Health:  - Sleep: sleeps well.  - Hearing: normal hearing bilateral ears.  - Vision: no vision problems.  - Dental: regular dental visits.     Health:  - History of STDs: no.   - Urinary symptoms: none.  "    Review of Systems   Constitutional:  Negative for chills, fatigue and fever.   HENT:  Negative for congestion and sore throat.    Eyes:  Negative for pain and visual disturbance.   Respiratory:  Negative for shortness of breath and wheezing.    Cardiovascular:  Negative for chest pain and palpitations.   Gastrointestinal:  Negative for abdominal pain, constipation, diarrhea, nausea and vomiting.   Genitourinary:  Negative for dysuria and frequency.   Musculoskeletal:  Negative for back pain and neck pain.   Skin:  Negative for color change and rash.   Neurological:  Negative for dizziness and headaches.   Psychiatric/Behavioral:  Negative for agitation and confusion.    All other systems reviewed and are negative.        Objective     /80 (BP Location: Left arm, Patient Position: Sitting, Cuff Size: Standard)   Pulse 70   Temp (!) 97.2 °F (36.2 °C) (Temporal)   Resp 18   Ht 5' 10\" (1.778 m)   Wt 96 kg (211 lb 9.6 oz)   SpO2 98%   BMI 30.36 kg/m²     Physical Exam  Vitals and nursing note reviewed.   Constitutional:       General: He is not in acute distress.     Appearance: He is well-developed.   HENT:      Head: Normocephalic and atraumatic.   Eyes:      Conjunctiva/sclera: Conjunctivae normal.   Cardiovascular:      Rate and Rhythm: Normal rate and regular rhythm.      Pulses: Normal pulses.      Heart sounds: No murmur heard.  Pulmonary:      Effort: Pulmonary effort is normal. No respiratory distress.      Breath sounds: Normal breath sounds.   Abdominal:      Palpations: Abdomen is soft.      Tenderness: There is no abdominal tenderness.   Musculoskeletal:         General: No swelling.      Cervical back: Neck supple.   Skin:     General: Skin is warm and dry.      Capillary Refill: Capillary refill takes less than 2 seconds.   Neurological:      Mental Status: He is alert.   Psychiatric:         Mood and Affect: Mood normal.         "

## 2024-08-29 NOTE — PATIENT INSTRUCTIONS
"Patient Education     Routine physical for adults   The Basics   Written by the doctors and editors at [facility]   What is a physical? -- A physical is a routine visit, or \"check-up,\" with your doctor. You might also hear it called a \"wellness visit\" or \"preventive visit.\"  During each visit, the doctor will:   Ask about your physical and mental health   Ask about your habits, behaviors, and lifestyle   Do an exam   Give you vaccines if needed   Talk to you about any medicines you take   Give advice about your health   Answer your questions  Getting regular check-ups is an important part of taking care of your health. It can help your doctor find and treat any problems you have. But it's also important for preventing health problems.  A routine physical is different from a \"sick visit.\" A sick visit is when you see a doctor because of a health concern or problem. Since physicals are scheduled ahead of time, you can think about what you want to ask the doctor.  How often should I get a physical? -- It depends on your age and health. In general, for people age 21 years and older:   If you are younger than 50 years, you might be able to get a physical every 3 years.   If you are 50 years or older, your doctor might recommend a physical every year.  If you have an ongoing health condition, like diabetes or high blood pressure, your doctor will probably want to see you more often.  What happens during a physical? -- In general, each visit will include:   Physical exam - The doctor or nurse will check your height, weight, heart rate, and blood pressure. They will also look at your eyes and ears. They will ask about how you are feeling and whether you have any symptoms that bother you.   Medicines - It's a good idea to bring a list of all the medicines you take to each doctor visit. Your doctor will talk to you about your medicines and answer any questions. Tell them if you are having any side effects that bother you. You " "should also tell them if you are having trouble paying for any of your medicines.   Habits and behaviors - This includes:   Your diet   Your exercise habits   Whether you smoke, drink alcohol, or use drugs   Whether you are sexually active   Whether you feel safe at home  Your doctor will talk to you about things you can do to improve your health and lower your risk of health problems. They will also offer help and support. For example, if you want to quit smoking, they can give you advice and might prescribe medicines. If you want to improve your diet or get more physical activity, they can help you with this, too.   Lab tests, if needed - The tests you get will depend on your age and situation. For example, your doctor might want to check your:   Cholesterol   Blood sugar   Iron level   Vaccines - The recommended vaccines will depend on your age, health, and what vaccines you already had. Vaccines are very important because they can prevent certain serious or deadly infections.   Discussion of screening - \"Screening\" means checking for diseases or other health problems before they cause symptoms. Your doctor can recommend screening based on your age, risk, and preferences. This might include tests to check for:   Cancer, such as breast, prostate, cervical, ovarian, colorectal, prostate, lung, or skin cancer   Sexually transmitted infections, such as chlamydia and gonorrhea   Mental health conditions like depression and anxiety  Your doctor will talk to you about the different types of screening tests. They can help you decide which screenings to have. They can also explain what the results might mean.   Answering questions - The physical is a good time to ask the doctor or nurse questions about your health. If needed, they can refer you to other doctors or specialists, too.  Adults older than 65 years often need other care, too. As you get older, your doctor will talk to you about:   How to prevent falling at " home   Hearing or vision tests   Memory testing   How to take your medicines safely   Making sure that you have the help and support you need at home  All topics are updated as new evidence becomes available and our peer review process is complete.  This topic retrieved from [employer] on: May 02, 2024.  Topic 364209 Version 1.0  Release: 32.4.3 - C32.122  © 2024 UpToDate, Inc. and/or its affiliates. All rights reserved.  Consumer Information Use and Disclaimer   Disclaimer: This generalized information is a limited summary of diagnosis, treatment, and/or medication information. It is not meant to be comprehensive and should be used as a tool to help the user understand and/or assess potential diagnostic and treatment options. It does NOT include all information about conditions, treatments, medications, side effects, or risks that may apply to a specific patient. It is not intended to be medical advice or a substitute for the medical advice, diagnosis, or treatment of a health care provider based on the health care provider's examination and assessment of a patient's specific and unique circumstances. Patients must speak with a health care provider for complete information about their health, medical questions, and treatment options, including any risks or benefits regarding use of medications. This information does not endorse any treatments or medications as safe, effective, or approved for treating a specific patient. UpToDate, Inc. and its affiliates disclaim any warranty or liability relating to this information or the use thereof.The use of this information is governed by the Terms of Use, available at https://www.woltersPathGroupuwer.com/en/know/clinical-effectiveness-terms. 2024© UpToDate, Inc. and its affiliates and/or licensors. All rights reserved.  Copyright   © 2024 UpToDate, Inc. and/or its affiliates. All rights reserved.

## 2024-08-30 ENCOUNTER — TELEPHONE (OUTPATIENT)
Age: 59
End: 2024-08-30

## 2024-08-30 NOTE — TELEPHONE ENCOUNTER
[facility] called to confirm the sig - Inject 20 Units under the skin every 12 (twelve) hours for the Degludec 20 mg is correct. Please advise.

## 2024-09-16 DIAGNOSIS — E11.65 TYPE 2 DIABETES MELLITUS WITH HYPERGLYCEMIA, WITH LONG-TERM CURRENT USE OF INSULIN (HCC): ICD-10-CM

## 2024-09-16 DIAGNOSIS — Z79.4 TYPE 2 DIABETES MELLITUS WITH HYPERGLYCEMIA, WITH LONG-TERM CURRENT USE OF INSULIN (HCC): ICD-10-CM

## 2024-09-16 RX ORDER — INSULIN DEGLUDEC 100 U/ML
20 INJECTION, SOLUTION SUBCUTANEOUS EVERY 12 HOURS
Qty: 15 ML | Refills: 1 | Status: SHIPPED | OUTPATIENT
Start: 2024-09-16

## 2024-09-16 RX ORDER — INSULIN DEGLUDEC 100 U/ML
20 INJECTION, SOLUTION SUBCUTANEOUS EVERY 12 HOURS
Qty: 15 ML | Refills: 1 | Status: CANCELLED | OUTPATIENT
Start: 2024-09-16

## 2024-09-30 DIAGNOSIS — K21.9 GASTROESOPHAGEAL REFLUX DISEASE WITHOUT ESOPHAGITIS: ICD-10-CM

## 2024-09-30 DIAGNOSIS — I10 ESSENTIAL HYPERTENSION: ICD-10-CM

## 2024-09-30 RX ORDER — METOPROLOL TARTRATE 25 MG/1
25 TABLET, FILM COATED ORAL 2 TIMES DAILY
Qty: 180 TABLET | Refills: 1 | Status: SHIPPED | OUTPATIENT
Start: 2024-09-30

## 2024-09-30 RX ORDER — LOSARTAN POTASSIUM 100 MG/1
100 TABLET ORAL EVERY MORNING
Qty: 90 TABLET | Refills: 1 | Status: SHIPPED | OUTPATIENT
Start: 2024-09-30

## 2024-12-04 DIAGNOSIS — E11.65 TYPE 2 DIABETES MELLITUS WITH HYPERGLYCEMIA, WITH LONG-TERM CURRENT USE OF INSULIN (HCC): ICD-10-CM

## 2024-12-04 DIAGNOSIS — Z79.4 TYPE 2 DIABETES MELLITUS WITH HYPERGLYCEMIA, WITH LONG-TERM CURRENT USE OF INSULIN (HCC): ICD-10-CM

## 2024-12-04 RX ORDER — INSULIN DEGLUDEC 100 U/ML
20 INJECTION, SOLUTION SUBCUTANEOUS EVERY 12 HOURS
Qty: 15 ML | Refills: 2 | Status: SHIPPED | OUTPATIENT
Start: 2024-12-04

## 2024-12-04 NOTE — TELEPHONE ENCOUNTER
Reason for call:   [x] Refill   [] Prior Auth  [] Other:     Office:   [x] PCP/Provider -   [] Specialty/Provider -     Medication: Insulin Degludec FlexTouch 100 UNIT/ML SOPN Inject 20 Units under the skin every 12 (twelve) hours       Pharmacy: [id number] - [place], [place] -  [address]      Does the patient have enough for 3 days?   [] Yes   [x] No - Send as HP to POD

## 2024-12-20 ENCOUNTER — TELEPHONE (OUTPATIENT)
Dept: FAMILY MEDICINE CLINIC | Facility: CLINIC | Age: 59
End: 2024-12-20

## 2024-12-20 NOTE — TELEPHONE ENCOUNTER
Patient is calling to confirm that we received the fax from [employer]  Phone [phone]  fax [phone]  Patient is almost out of needles. Patient states they are waiting for that paper to send out patient's needles.

## 2024-12-20 NOTE — TELEPHONE ENCOUNTER
Emma with [employer] called states faxed request for patient diabetic pen needles on 12/17-12/18-12/19 and 12/20. Would like to know if received please call [phone]

## 2024-12-23 DIAGNOSIS — Z79.4 TYPE 2 DIABETES MELLITUS WITH HYPERGLYCEMIA, WITH LONG-TERM CURRENT USE OF INSULIN (HCC): ICD-10-CM

## 2024-12-23 DIAGNOSIS — E11.65 TYPE 2 DIABETES MELLITUS WITH HYPERGLYCEMIA, WITH LONG-TERM CURRENT USE OF INSULIN (HCC): ICD-10-CM

## 2024-12-24 RX ORDER — GLIPIZIDE 5 MG/1
5 TABLET, FILM COATED, EXTENDED RELEASE ORAL DAILY
Qty: 90 TABLET | Refills: 1 | Status: SHIPPED | OUTPATIENT
Start: 2024-12-24

## 2025-02-07 ENCOUNTER — RESULTS FOLLOW-UP (OUTPATIENT)
Dept: FAMILY MEDICINE CLINIC | Facility: CLINIC | Age: 60
End: 2025-02-07

## 2025-02-07 LAB
25(OH)D3+25(OH)D2 SERPL-MCNC: 46.5 NG/ML (ref 30–100)
ALBUMIN SERPL-MCNC: 4.1 G/DL (ref 3.8–4.9)
ALP SERPL-CCNC: 77 IU/L (ref 44–121)
ALT SERPL-CCNC: 22 IU/L (ref 0–44)
AST SERPL-CCNC: 29 IU/L (ref 0–40)
BASOPHILS # BLD AUTO: 0.1 X10E3/UL (ref 0–0.2)
BASOPHILS NFR BLD AUTO: 1 %
BILIRUB SERPL-MCNC: 0.7 MG/DL (ref 0–1.2)
BUN SERPL-MCNC: 20 MG/DL (ref 6–24)
BUN/CREAT SERPL: 13 (ref 9–20)
CALCIUM SERPL-MCNC: 10.2 MG/DL (ref 8.7–10.2)
CHLORIDE SERPL-SCNC: 93 MMOL/L (ref 96–106)
CHOLEST SERPL-MCNC: 166 MG/DL (ref 100–199)
CHOLEST/HDLC SERPL: 3.9 RATIO (ref 0–5)
CO2 SERPL-SCNC: 24 MMOL/L (ref 20–29)
CREAT SERPL-MCNC: 1.52 MG/DL (ref 0.76–1.27)
EGFR: 53 ML/MIN/1.73
EOSINOPHIL # BLD AUTO: 0.1 X10E3/UL (ref 0–0.4)
EOSINOPHIL NFR BLD AUTO: 1 %
ERYTHROCYTE [DISTWIDTH] IN BLOOD BY AUTOMATED COUNT: 12.8 % (ref 11.6–15.4)
GLOBULIN SER-MCNC: 2.8 G/DL (ref 1.5–4.5)
GLUCOSE SERPL-MCNC: 77 MG/DL (ref 70–99)
HCT VFR BLD AUTO: 47.8 % (ref 37.5–51)
HDLC SERPL-MCNC: 43 MG/DL
HGB BLD-MCNC: 16.1 G/DL (ref 13–17.7)
IMM GRANULOCYTES # BLD: 0 X10E3/UL (ref 0–0.1)
IMM GRANULOCYTES NFR BLD: 0 %
LDLC SERPL CALC-MCNC: 96 MG/DL (ref 0–99)
LYMPHOCYTES # BLD AUTO: 2 X10E3/UL (ref 0.7–3.1)
LYMPHOCYTES NFR BLD AUTO: 20 %
MCH RBC QN AUTO: 29.7 PG (ref 26.6–33)
MCHC RBC AUTO-ENTMCNC: 33.7 G/DL (ref 31.5–35.7)
MCV RBC AUTO: 88 FL (ref 79–97)
MICRODELETION SYND BLD/T FISH: NORMAL
MICRODELETION SYND BLD/T FISH: NORMAL
MONOCYTES # BLD AUTO: 0.5 X10E3/UL (ref 0.1–0.9)
MONOCYTES NFR BLD AUTO: 5 %
NEUTROPHILS # BLD AUTO: 7.7 X10E3/UL (ref 1.4–7)
NEUTROPHILS NFR BLD AUTO: 73 %
PLATELET # BLD AUTO: 320 X10E3/UL (ref 150–450)
POTASSIUM SERPL-SCNC: 4.7 MMOL/L (ref 3.5–5.2)
PROT SERPL-MCNC: 6.9 G/DL (ref 6–8.5)
PSA FREE MFR SERPL: 58.8 %
PSA FREE SERPL-MCNC: 0.94 NG/ML
PSA SERPL-MCNC: 1.6 NG/ML (ref 0–4)
RBC # BLD AUTO: 5.42 X10E6/UL (ref 4.14–5.8)
SL AMB VLDL CHOLESTEROL CALC: 27 MG/DL (ref 5–40)
SODIUM SERPL-SCNC: 136 MMOL/L (ref 134–144)
TRIGL SERPL-MCNC: 156 MG/DL (ref 0–149)
TSH SERPL DL<=0.005 MIU/L-ACNC: 2.34 UIU/ML (ref 0.45–4.5)
WBC # BLD AUTO: 10.4 X10E3/UL (ref 3.4–10.8)

## 2025-02-13 ENCOUNTER — OFFICE VISIT (OUTPATIENT)
Dept: FAMILY MEDICINE CLINIC | Facility: CLINIC | Age: 60
End: 2025-02-13
Payer: COMMERCIAL

## 2025-02-13 VITALS
DIASTOLIC BLOOD PRESSURE: 80 MMHG | TEMPERATURE: 97.9 F | OXYGEN SATURATION: 97 % | RESPIRATION RATE: 12 BRPM | HEART RATE: 77 BPM | BODY MASS INDEX: 30.64 KG/M2 | WEIGHT: 214 LBS | SYSTOLIC BLOOD PRESSURE: 130 MMHG | HEIGHT: 70 IN

## 2025-02-13 DIAGNOSIS — Z79.4 TYPE 2 DIABETES MELLITUS WITH HYPERGLYCEMIA, WITH LONG-TERM CURRENT USE OF INSULIN (HCC): Primary | ICD-10-CM

## 2025-02-13 DIAGNOSIS — G89.29 CHRONIC MIDLINE LOW BACK PAIN WITHOUT SCIATICA: ICD-10-CM

## 2025-02-13 DIAGNOSIS — E78.2 MIXED HYPERLIPIDEMIA: ICD-10-CM

## 2025-02-13 DIAGNOSIS — K21.9 GASTROESOPHAGEAL REFLUX DISEASE WITHOUT ESOPHAGITIS: ICD-10-CM

## 2025-02-13 DIAGNOSIS — M54.50 CHRONIC MIDLINE LOW BACK PAIN WITHOUT SCIATICA: ICD-10-CM

## 2025-02-13 DIAGNOSIS — E11.65 TYPE 2 DIABETES MELLITUS WITH HYPERGLYCEMIA, WITH LONG-TERM CURRENT USE OF INSULIN (HCC): Primary | ICD-10-CM

## 2025-02-13 DIAGNOSIS — R79.89 ELEVATED SERUM CREATININE: ICD-10-CM

## 2025-02-13 DIAGNOSIS — E87.8 ELECTROLYTE ABNORMALITY: ICD-10-CM

## 2025-02-13 DIAGNOSIS — I10 ESSENTIAL HYPERTENSION: ICD-10-CM

## 2025-02-13 LAB — SL AMB POCT HEMOGLOBIN AIC: 7.2 (ref ?–6.5)

## 2025-02-13 PROCEDURE — 83036 HEMOGLOBIN GLYCOSYLATED A1C: CPT | Performed by: STUDENT IN AN ORGANIZED HEALTH CARE EDUCATION/TRAINING PROGRAM

## 2025-02-13 PROCEDURE — 99214 OFFICE O/P EST MOD 30 MIN: CPT | Performed by: STUDENT IN AN ORGANIZED HEALTH CARE EDUCATION/TRAINING PROGRAM

## 2025-02-13 RX ORDER — INSULIN DEGLUDEC 100 U/ML
20 INJECTION, SOLUTION SUBCUTANEOUS EVERY 12 HOURS
Qty: 15 ML | Refills: 2 | Status: SHIPPED | OUTPATIENT
Start: 2025-02-13

## 2025-02-13 RX ORDER — NEEDLES, DISPOSABLE 25GX5/8"
NEEDLE, DISPOSABLE MISCELLANEOUS 2 TIMES DAILY
Qty: 200 EACH | Refills: 3 | Status: SHIPPED | OUTPATIENT
Start: 2025-02-13

## 2025-02-13 NOTE — PROGRESS NOTES
"Name: Yovani Guzmán      : 1965      MRN: [id number]  Encounter Provider: Pollo Diez DO  Encounter Date: 2025   Encounter department: [facility]  :  Assessment & Plan  Type 2 diabetes mellitus with hyperglycemia, with long-term current use of insulin (HCC)  Significant improvement in hemoglobin A1c to 7.2%, continue insulin therapy, continue glipizide, reduce metformin to 500 mg twice daily given recent kidney function, recheck CMP, blood pressure well-controlled.  Lab Results   Component Value Date    HGBA1C 7.2 (A) 2025     Orders:  •  POCT hemoglobin A1c  •  IRIS Diabetic eye exam  •  Albumin / creatinine urine ratio  •  metFORMIN (GLUCOPHAGE) 500 mg tablet; Take 1 tablet (500 mg total) by mouth 2 (two) times a day with meals  •  Insulin Degludec FlexTouch 100 UNIT/ML SOPN; Inject 20 Units under the skin every 12 (twelve) hours  •  NEEDLE, DISP, 30 G (BD Disp Needles) 30G X 1/2\" MISC; Use 2 (two) times a day    Essential hypertension  Continue antihypertensive medication, well-controlled today in office       Gastroesophageal reflux disease without esophagitis  Continue OTC symptomatic management       Chronic midline low back pain without sciatica  Chronic, stable       Mixed hyperlipidemia  Continue statin therapy       Elevated serum creatinine  Recheck CMP, likely dehydrated state  Orders:  •  Comprehensive metabolic panel; Future    Electrolyte abnormality    Orders:  •  Comprehensive metabolic panel; Future           History of Present Illness   Follow up A1c check.       Review of Systems   Constitutional:  Negative for chills and fever.   HENT:  Negative for ear pain and sore throat.    Eyes:  Negative for pain and visual disturbance.   Respiratory:  Negative for cough and shortness of breath.    Cardiovascular:  Negative for chest pain and palpitations.   Gastrointestinal:  Negative for abdominal pain, constipation, diarrhea, nausea and " "vomiting.   Genitourinary:  Negative for dysuria and hematuria.   Musculoskeletal:  Negative for arthralgias and back pain.   Skin:  Negative for color change and rash.   Neurological:  Negative for dizziness, seizures, syncope and headaches.   Psychiatric/Behavioral:  Negative for agitation, behavioral problems and confusion.    All other systems reviewed and are negative.      Objective   /80 (BP Location: Left arm, Patient Position: Sitting, Cuff Size: Large)   Pulse 77   Temp 97.9 °F (36.6 °C) (Temporal)   Resp 12   Ht 5' 10\" (1.778 m)   Wt 97.1 kg (214 lb)   SpO2 97%   BMI 30.71 kg/m²      Physical Exam  Vitals and nursing note reviewed.   Constitutional:       General: He is not in acute distress.     Appearance: He is well-developed.   HENT:      Head: Normocephalic and atraumatic.   Eyes:      General: No scleral icterus.     Conjunctiva/sclera: Conjunctivae normal.   Cardiovascular:      Rate and Rhythm: Normal rate and regular rhythm.      Pulses: Normal pulses.      Heart sounds: No murmur heard.  Pulmonary:      Effort: Pulmonary effort is normal. No respiratory distress.      Breath sounds: Normal breath sounds.   Abdominal:      General: Bowel sounds are normal. There is no distension.      Palpations: Abdomen is soft.      Tenderness: There is no abdominal tenderness.   Musculoskeletal:         General: No swelling. Normal range of motion.      Cervical back: Neck supple.   Skin:     General: Skin is warm and dry.      Capillary Refill: Capillary refill takes less than 2 seconds.      Coloration: Skin is not jaundiced.   Neurological:      General: No focal deficit present.      Mental Status: He is alert and oriented to person, place, and time. Mental status is at baseline.   Psychiatric:         Mood and Affect: Mood normal.         Behavior: Behavior normal.         "

## 2025-02-13 NOTE — PROGRESS NOTES
Diabetic Foot Exam    Patient's shoes and socks removed.    Right Foot/Ankle   Right Foot Inspection  Skin Exam: skin normal, skin intact, dry skin, callus and callus. No warmth, no erythema, no maceration, no abnormal color, no pre-ulcer and no ulcer.     Toe Exam: ROM and strength within normal limits.     Sensory   Vibration: intact  Proprioception: intact  Monofilament testing: intact    Vascular  The right PT pulse is 2+.     Left Foot/Ankle  Left Foot Inspection  Skin Exam: skin normal, skin intact, dry skin and callus. No warmth, no erythema, no maceration, normal color, no pre-ulcer and no ulcer.     Toe Exam: ROM and strength within normal limits.     Sensory   Vibration: intact  Proprioception: intact  Monofilament testing: intact    Vascular  The left PT pulse is 2+.     Assign Risk Category  No deformity present  No loss of protective sensation  No weak pulses  Risk: 0

## 2025-02-13 NOTE — ASSESSMENT & PLAN NOTE
"Significant improvement in hemoglobin A1c to 7.2%, continue insulin therapy, continue glipizide, reduce metformin to 500 mg twice daily given recent kidney function, recheck CMP, blood pressure well-controlled.  Lab Results   Component Value Date    HGBA1C 7.2 (A) 02/13/2025     Orders:  •  POCT hemoglobin A1c  •  IRIS Diabetic eye exam  •  Albumin / creatinine urine ratio  •  metFORMIN (GLUCOPHAGE) 500 mg tablet; Take 1 tablet (500 mg total) by mouth 2 (two) times a day with meals  •  Insulin Degludec FlexTouch 100 UNIT/ML SOPN; Inject 20 Units under the skin every 12 (twelve) hours  •  NEEDLE, DISP, 30 G (BD Disp Needles) 30G X 1/2\" MISC; Use 2 (two) times a day  "

## 2025-02-14 LAB
ALBUMIN/CREAT UR: 5688 MG/G CREAT (ref 0–29)
CREAT UR-MCNC: 89.3 MG/DL
MICROALBUMIN UR-MCNC: 5079.8 UG/ML

## 2025-03-25 DIAGNOSIS — I10 ESSENTIAL HYPERTENSION: ICD-10-CM

## 2025-03-25 DIAGNOSIS — K21.9 GASTROESOPHAGEAL REFLUX DISEASE WITHOUT ESOPHAGITIS: ICD-10-CM

## 2025-03-26 DIAGNOSIS — E78.2 MIXED HYPERLIPIDEMIA: ICD-10-CM

## 2025-03-26 RX ORDER — OMEPRAZOLE 20 MG/1
20 CAPSULE, DELAYED RELEASE ORAL DAILY
Qty: 90 CAPSULE | Refills: 1 | Status: SHIPPED | OUTPATIENT
Start: 2025-03-26

## 2025-03-26 RX ORDER — METOPROLOL TARTRATE 25 MG/1
12.5 TABLET, FILM COATED ORAL 2 TIMES DAILY
Qty: 90 TABLET | Refills: 1 | Status: SHIPPED | OUTPATIENT
Start: 2025-03-26

## 2025-03-26 RX ORDER — LOSARTAN POTASSIUM 100 MG/1
100 TABLET ORAL EVERY MORNING
Qty: 90 TABLET | Refills: 1 | Status: SHIPPED | OUTPATIENT
Start: 2025-03-26

## 2025-03-27 RX ORDER — ATORVASTATIN CALCIUM 20 MG/1
20 TABLET, FILM COATED ORAL
Qty: 90 TABLET | Refills: 1 | Status: SHIPPED | OUTPATIENT
Start: 2025-03-27

## 2025-06-05 DIAGNOSIS — Z79.4 TYPE 2 DIABETES MELLITUS WITH HYPERGLYCEMIA, WITH LONG-TERM CURRENT USE OF INSULIN (HCC): ICD-10-CM

## 2025-06-05 DIAGNOSIS — E11.65 TYPE 2 DIABETES MELLITUS WITH HYPERGLYCEMIA, WITH LONG-TERM CURRENT USE OF INSULIN (HCC): ICD-10-CM

## 2025-06-06 RX ORDER — INSULIN DEGLUDEC 100 U/ML
20 INJECTION, SOLUTION SUBCUTANEOUS EVERY 12 HOURS
Qty: 15 ML | Refills: 0 | Status: SHIPPED | OUTPATIENT
Start: 2025-06-06

## 2025-06-20 DIAGNOSIS — Z79.4 TYPE 2 DIABETES MELLITUS WITH HYPERGLYCEMIA, WITH LONG-TERM CURRENT USE OF INSULIN (HCC): ICD-10-CM

## 2025-06-20 DIAGNOSIS — E11.65 TYPE 2 DIABETES MELLITUS WITH HYPERGLYCEMIA, WITH LONG-TERM CURRENT USE OF INSULIN (HCC): ICD-10-CM

## 2025-06-22 RX ORDER — GLIPIZIDE 5 MG/1
5 TABLET, FILM COATED, EXTENDED RELEASE ORAL DAILY
Qty: 90 TABLET | Refills: 1 | Status: SHIPPED | OUTPATIENT
Start: 2025-06-22

## 2025-07-10 LAB
ALBUMIN SERPL-MCNC: 4.1 G/DL (ref 3.8–4.9)
ALP SERPL-CCNC: 89 IU/L (ref 44–121)
ALT SERPL-CCNC: 17 IU/L (ref 0–44)
AST SERPL-CCNC: 25 IU/L (ref 0–40)
BILIRUB SERPL-MCNC: 0.6 MG/DL (ref 0–1.2)
BUN SERPL-MCNC: 18 MG/DL (ref 6–24)
BUN/CREAT SERPL: 13 (ref 9–20)
CALCIUM SERPL-MCNC: 10 MG/DL (ref 8.7–10.2)
CHLORIDE SERPL-SCNC: 95 MMOL/L (ref 96–106)
CO2 SERPL-SCNC: 23 MMOL/L (ref 20–29)
CREAT SERPL-MCNC: 1.35 MG/DL (ref 0.76–1.27)
EGFR: 61 ML/MIN/1.73
GLOBULIN SER-MCNC: 3 G/DL (ref 1.5–4.5)
GLUCOSE SERPL-MCNC: 103 MG/DL (ref 70–99)
POTASSIUM SERPL-SCNC: 4.6 MMOL/L (ref 3.5–5.2)
PROT SERPL-MCNC: 7.1 G/DL (ref 6–8.5)
SODIUM SERPL-SCNC: 136 MMOL/L (ref 134–144)

## 2025-07-15 ENCOUNTER — OFFICE VISIT (OUTPATIENT)
Dept: FAMILY MEDICINE CLINIC | Facility: CLINIC | Age: 60
End: 2025-07-15
Payer: COMMERCIAL

## 2025-07-15 VITALS
OXYGEN SATURATION: 96 % | BODY MASS INDEX: 30.38 KG/M2 | HEIGHT: 70 IN | TEMPERATURE: 97.7 F | DIASTOLIC BLOOD PRESSURE: 80 MMHG | WEIGHT: 212.2 LBS | SYSTOLIC BLOOD PRESSURE: 140 MMHG | RESPIRATION RATE: 18 BRPM | HEART RATE: 75 BPM

## 2025-07-15 DIAGNOSIS — E78.2 MIXED HYPERLIPIDEMIA: ICD-10-CM

## 2025-07-15 DIAGNOSIS — E11.65 TYPE 2 DIABETES MELLITUS WITH HYPERGLYCEMIA, WITH LONG-TERM CURRENT USE OF INSULIN (HCC): Primary | ICD-10-CM

## 2025-07-15 DIAGNOSIS — M54.50 CHRONIC MIDLINE LOW BACK PAIN WITHOUT SCIATICA: ICD-10-CM

## 2025-07-15 DIAGNOSIS — K21.9 GASTROESOPHAGEAL REFLUX DISEASE WITHOUT ESOPHAGITIS: ICD-10-CM

## 2025-07-15 DIAGNOSIS — I10 ESSENTIAL HYPERTENSION: ICD-10-CM

## 2025-07-15 DIAGNOSIS — Z79.4 TYPE 2 DIABETES MELLITUS WITH HYPERGLYCEMIA, WITH LONG-TERM CURRENT USE OF INSULIN (HCC): Primary | ICD-10-CM

## 2025-07-15 DIAGNOSIS — G89.29 CHRONIC MIDLINE LOW BACK PAIN WITHOUT SCIATICA: ICD-10-CM

## 2025-07-15 LAB — SL AMB POCT HEMOGLOBIN AIC: 9.5 (ref ?–6.5)

## 2025-07-15 PROCEDURE — 83036 HEMOGLOBIN GLYCOSYLATED A1C: CPT | Performed by: STUDENT IN AN ORGANIZED HEALTH CARE EDUCATION/TRAINING PROGRAM

## 2025-07-15 PROCEDURE — 99214 OFFICE O/P EST MOD 30 MIN: CPT | Performed by: STUDENT IN AN ORGANIZED HEALTH CARE EDUCATION/TRAINING PROGRAM

## 2025-07-15 RX ORDER — INSULIN DEGLUDEC 100 U/ML
20 INJECTION, SOLUTION SUBCUTANEOUS EVERY 12 HOURS
Qty: 15 ML | Refills: 0 | Status: SHIPPED | OUTPATIENT
Start: 2025-07-15

## 2025-07-15 RX ORDER — NEEDLES, DISPOSABLE 25GX5/8"
NEEDLE, DISPOSABLE MISCELLANEOUS 2 TIMES DAILY
Qty: 200 EACH | Refills: 3 | Status: SHIPPED | OUTPATIENT
Start: 2025-07-15

## 2025-08-19 DIAGNOSIS — E11.65 TYPE 2 DIABETES MELLITUS WITH HYPERGLYCEMIA, WITH LONG-TERM CURRENT USE OF INSULIN (HCC): ICD-10-CM

## 2025-08-19 DIAGNOSIS — Z79.4 TYPE 2 DIABETES MELLITUS WITH HYPERGLYCEMIA, WITH LONG-TERM CURRENT USE OF INSULIN (HCC): ICD-10-CM

## 2025-08-21 RX ORDER — INSULIN DEGLUDEC 100 U/ML
20 INJECTION, SOLUTION SUBCUTANEOUS EVERY 12 HOURS
Qty: 15 ML | Refills: 3 | Status: SHIPPED | OUTPATIENT
Start: 2025-08-21